# Patient Record
Sex: FEMALE
[De-identification: names, ages, dates, MRNs, and addresses within clinical notes are randomized per-mention and may not be internally consistent; named-entity substitution may affect disease eponyms.]

---

## 2018-02-12 ENCOUNTER — APPOINTMENT (OUTPATIENT)
Dept: OPHTHALMOLOGY | Facility: CLINIC | Age: 68
End: 2018-02-12
Payer: MEDICARE

## 2018-02-12 DIAGNOSIS — G35 MULTIPLE SCLEROSIS: ICD-10-CM

## 2018-02-12 PROCEDURE — 99203 OFFICE O/P NEW LOW 30 MIN: CPT

## 2018-02-12 RX ORDER — PROPANTHELINE BROMIDE 15 MG/1
15 TABLET, FILM COATED ORAL
Refills: 0 | Status: ACTIVE | COMMUNITY

## 2018-05-03 ENCOUNTER — APPOINTMENT (OUTPATIENT)
Dept: NEUROSURGERY | Facility: CLINIC | Age: 68
End: 2018-05-03
Payer: MEDICARE

## 2018-05-03 ENCOUNTER — APPOINTMENT (OUTPATIENT)
Dept: NEUROLOGY | Facility: CLINIC | Age: 68
End: 2018-05-03

## 2018-05-03 ENCOUNTER — APPOINTMENT (OUTPATIENT)
Dept: NEUROLOGY | Facility: CLINIC | Age: 68
End: 2018-05-03
Payer: MEDICARE

## 2018-05-03 DIAGNOSIS — G93.9 DISORDER OF BRAIN, UNSPECIFIED: ICD-10-CM

## 2018-05-03 DIAGNOSIS — R51 HEADACHE: ICD-10-CM

## 2018-05-03 PROCEDURE — 99203 OFFICE O/P NEW LOW 30 MIN: CPT

## 2018-05-03 PROCEDURE — 99205 OFFICE O/P NEW HI 60 MIN: CPT

## 2018-05-04 ENCOUNTER — TRANSCRIPTION ENCOUNTER (OUTPATIENT)
Age: 68
End: 2018-05-04

## 2018-05-07 ENCOUNTER — OUTPATIENT (OUTPATIENT)
Dept: OUTPATIENT SERVICES | Facility: HOSPITAL | Age: 68
LOS: 1 days | End: 2018-05-07

## 2018-05-07 VITALS
RESPIRATION RATE: 18 BRPM | DIASTOLIC BLOOD PRESSURE: 87 MMHG | HEIGHT: 64 IN | OXYGEN SATURATION: 98 % | HEART RATE: 63 BPM | SYSTOLIC BLOOD PRESSURE: 136 MMHG | WEIGHT: 115.96 LBS | TEMPERATURE: 98 F

## 2018-05-07 DIAGNOSIS — R56.9 UNSPECIFIED CONVULSIONS: ICD-10-CM

## 2018-05-07 DIAGNOSIS — Z01.818 ENCOUNTER FOR OTHER PREPROCEDURAL EXAMINATION: ICD-10-CM

## 2018-05-07 DIAGNOSIS — Z29.9 ENCOUNTER FOR PROPHYLACTIC MEASURES, UNSPECIFIED: ICD-10-CM

## 2018-05-07 DIAGNOSIS — D49.6 NEOPLASM OF UNSPECIFIED BEHAVIOR OF BRAIN: ICD-10-CM

## 2018-05-07 DIAGNOSIS — Z90.89 ACQUIRED ABSENCE OF OTHER ORGANS: Chronic | ICD-10-CM

## 2018-05-07 PROBLEM — R51 HEADACHE: Status: ACTIVE | Noted: 2018-05-07

## 2018-05-07 PROBLEM — G93.9 BRAIN LESION: Status: ACTIVE | Noted: 2018-05-07

## 2018-05-07 LAB
ANION GAP SERPL CALC-SCNC: 13 MMOL/L — SIGNIFICANT CHANGE UP (ref 5–17)
BLD GP AB SCN SERPL QL: NEGATIVE — SIGNIFICANT CHANGE UP
BUN SERPL-MCNC: 25 MG/DL — HIGH (ref 7–23)
CALCIUM SERPL-MCNC: 9.6 MG/DL — SIGNIFICANT CHANGE UP (ref 8.4–10.5)
CHLORIDE SERPL-SCNC: 101 MMOL/L — SIGNIFICANT CHANGE UP (ref 96–108)
CO2 SERPL-SCNC: 28 MMOL/L — SIGNIFICANT CHANGE UP (ref 22–31)
CREAT SERPL-MCNC: 0.61 MG/DL — SIGNIFICANT CHANGE UP (ref 0.5–1.3)
GLUCOSE SERPL-MCNC: 74 MG/DL — SIGNIFICANT CHANGE UP (ref 70–99)
HCT VFR BLD CALC: 39.7 % — SIGNIFICANT CHANGE UP (ref 34.5–45)
HGB BLD-MCNC: 12.7 G/DL — SIGNIFICANT CHANGE UP (ref 11.5–15.5)
MCHC RBC-ENTMCNC: 29.7 PG — SIGNIFICANT CHANGE UP (ref 27–34)
MCHC RBC-ENTMCNC: 32 GM/DL — SIGNIFICANT CHANGE UP (ref 32–36)
MCV RBC AUTO: 92.8 FL — SIGNIFICANT CHANGE UP (ref 80–100)
MRSA PCR RESULT.: SIGNIFICANT CHANGE UP
PLATELET # BLD AUTO: 279 K/UL — SIGNIFICANT CHANGE UP (ref 150–400)
POTASSIUM SERPL-MCNC: 4.2 MMOL/L — SIGNIFICANT CHANGE UP (ref 3.5–5.3)
POTASSIUM SERPL-SCNC: 4.2 MMOL/L — SIGNIFICANT CHANGE UP (ref 3.5–5.3)
RBC # BLD: 4.28 M/UL — SIGNIFICANT CHANGE UP (ref 3.8–5.2)
RBC # FLD: 13.5 % — SIGNIFICANT CHANGE UP (ref 10.3–14.5)
RH IG SCN BLD-IMP: POSITIVE — SIGNIFICANT CHANGE UP
S AUREUS DNA NOSE QL NAA+PROBE: DETECTED
SODIUM SERPL-SCNC: 142 MMOL/L — SIGNIFICANT CHANGE UP (ref 135–145)
WBC # BLD: 7 K/UL — SIGNIFICANT CHANGE UP (ref 3.8–10.5)
WBC # FLD AUTO: 7 K/UL — SIGNIFICANT CHANGE UP (ref 3.8–10.5)

## 2018-05-07 RX ORDER — SODIUM CHLORIDE 9 MG/ML
3 INJECTION INTRAMUSCULAR; INTRAVENOUS; SUBCUTANEOUS EVERY 8 HOURS
Qty: 0 | Refills: 0 | Status: DISCONTINUED | OUTPATIENT
Start: 2018-05-10 | End: 2018-05-10

## 2018-05-07 RX ORDER — LIDOCAINE HCL 20 MG/ML
0.2 VIAL (ML) INJECTION ONCE
Qty: 0 | Refills: 0 | Status: DISCONTINUED | OUTPATIENT
Start: 2018-05-10 | End: 2018-05-10

## 2018-05-07 RX ORDER — CEFAZOLIN SODIUM 1 G
2000 VIAL (EA) INJECTION ONCE
Qty: 0 | Refills: 0 | Status: DISCONTINUED | OUTPATIENT
Start: 2018-05-10 | End: 2018-05-10

## 2018-05-07 NOTE — H&P PST ADULT - ATTENDING COMMENTS
The patient has a new enhancing lesion affecting her right motor and premotor areas, causing left UE weakness in addition to prior deficits from MS. DDx includes various neoplasms and tumefactive MS. Plan: right craniotomy for biopsy. Risks, benefits, and alternatives have been discussed with the patient and her , and all questions were answered.

## 2018-05-07 NOTE — H&P PST ADULT - PRESSURE ULCER
Pt with left pressure sore on left heel and what appears to be fungus on he left big toe, consulted a podiatrist

## 2018-05-07 NOTE — H&P PST ADULT - HISTORY OF PRESENT ILLNESS
67yr old female presents to [facility] for a Stereotactic Biopsy for Tumor PC on 5/10/18. 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 Abnormality seen on recent MRI brain (large multinodular enhancing lesion) from 4/23/18. Pt wheelchair bound since 1987. Her baseline 6 months ago was paraplegic, incontinent, with little function of her right UE. Since her seizure in April, she has had reduced function of left hand significantly affecting any independence. Denies chest pain or SOB.

## 2018-05-07 NOTE — H&P PST ADULT - ASSESSMENT
CAPRINI SCORE [CLOT]    AGE RELATED RISK FACTORS                                                       MOBILITY RELATED FACTORS  [ ] Age 41-60 years                                            (1 Point)                  [ ] Bed rest                                                        (1 Point)  [ ] Age: 61-74 years                                           (2 Points)                 [ ] Plaster cast                                                   (2 Points)  [ ] Age= 75 years                                              (3 Points)                 [ ] Bed bound for more than 72 hours                 (2 Points)    DISEASE RELATED RISK FACTORS                                               GENDER SPECIFIC FACTORS  [ ] Edema in the lower extremities                       (1 Point)                  [ ] Pregnancy                                                     (1 Point)  [ ] Varicose veins                                               (1 Point)                  [ ] Post-partum < 6 weeks                                   (1 Point)             [ ] BMI > 25 Kg/m2                                            (1 Point)                  [ ] Hormonal therapy  or oral contraception          (1 Point)                 [ ] Sepsis (in the previous month)                        (1 Point)                  [ ] History of pregnancy complications                 (1 point)  [ ] Pneumonia or serious lung disease                                               [ ] Unexplained or recurrent                     (1 Point)           (in the previous month)                               (1 Point)  [ ] Abnormal pulmonary function test                     (1 Point)                 SURGERY RELATED RISK FACTORS  [ ] Acute myocardial infarction                              (1 Point)                 [ ]  Section                                             (1 Point)  [ ] Congestive heart failure (in the previous month)  (1 Point)               [ ] Minor surgery                                                  (1 Point)   [ ] Inflammatory bowel disease                             (1 Point)                 [ ] Arthroscopic surgery                                        (2 Points)  [ ] Central venous access                                      (2 Points)                [ ] General surgery lasting more than 45 minutes   (2 Points)       [ ] Stroke (in the previous month)                          (5 Points)               [ ] Elective arthroplasty                                         (5 Points)                                                                                                                                               HEMATOLOGY RELATED FACTORS                                                 TRAUMA RELATED RISK FACTORS  [ ] Prior episodes of VTE                                     (3 Points)                 [ ] Fracture of the hip, pelvis, or leg                       (5 Points)  [ ] Positive family history for VTE                         (3 Points)                 [ ] Acute spinal cord injury (in the previous month)  (5 Points)  [ ] Prothrombin 11259 A                                     (3 Points)                 [ ] Paralysis  (less than 1 month)                             (5 Points)  [ ] Factor V Leiden                                             (3 Points)                  [ ] Multiple Trauma within 1 month                        (5 Points)  [ ] Lupus anticoagulants                                     (3 Points)                                                           [ ] Anticardiolipin antibodies                               (3 Points)                                                       [ ] High homocysteine in the blood                      (3 Points)                                             [ ] Other congenital or acquired thrombophilia      (3 Points)                                                [ ] Heparin induced thrombocytopenia                  (3 Points)                                          Total Score [          ] CAPRINI SCORE [CLOT]    AGE RELATED RISK FACTORS                                                       MOBILITY RELATED FACTORS  [ ] Age 41-60 years                                            (1 Point)                  [x] Bed rest                                                        (1 Point)  [x ] Age: 61-74 years                                         (2 Points)            [ ] Plaster cast                                                   (2 Points)  [ ] Age= 75 years                                              (3 Points)                 [ ] Bed bound for more than 72 hours                 (2 Points)    DISEASE RELATED RISK FACTORS                                               GENDER SPECIFIC FACTORS  [ ] Edema in the lower extremities                       (1 Point)                  [ ] Pregnancy                                                     (1 Point)  [ ] Varicose veins                                               (1 Point)                  [ ] Post-partum < 6 weeks                                   (1 Point)             [ ] BMI > 25 Kg/m2                                            (1 Point)                  [ ] Hormonal therapy  or oral contraception          (1 Point)                 [ ] Sepsis (in the previous month)                        (1 Point)                  [ ] History of pregnancy complications                 (1 point)  [ ] Pneumonia or serious lung disease                                               [ ] Unexplained or recurrent                     (1 Point)           (in the previous month)                               (1 Point)  [ ] Abnormal pulmonary function test                     (1 Point)                 SURGERY RELATED RISK FACTORS  [ ] Acute myocardial infarction                              (1 Point)                 [ ]  Section                                             (1 Point)  [ ] Congestive heart failure (in the previous month)  (1 Point)               [ ] Minor surgery                                                  (1 Point)   [ ] Inflammatory bowel disease                             (1 Point)                 [ ] Arthroscopic surgery                                        (2 Points)  [ ] Central venous access                                      (2 Points)                [x ] General surgery lasting more than 45 minutes   (2 Points)       [ ] Stroke (in the previous month)                          (5 Points)               [ ] Elective arthroplasty                                         (5 Points)                                                                                                                                               HEMATOLOGY RELATED FACTORS                                                 TRAUMA RELATED RISK FACTORS  [ ] Prior episodes of VTE                                     (3 Points)                 [ ] Fracture of the hip, pelvis, or leg                       (5 Points)  [ ] Positive family history for VTE                         (3 Points)                 [ ] Acute spinal cord injury (in the previous month)  (5 Points)  [ ] Prothrombin 40010 A                                     (3 Points)                 [ ] Paralysis  (less than 1 month)                             (5 Points)  [ ] Factor V Leiden                                             (3 Points)                  [ ] Multiple Trauma within 1 month                        (5 Points)  [ ] Lupus anticoagulants                                     (3 Points)                                                           [ ] Anticardiolipin antibodies                               (3 Points)                                                       [ ] High homocysteine in the blood                      (3 Points)                                             [ ] Other congenital or acquired thrombophilia      (3 Points)                                                [ ] Heparin induced thrombocytopenia                  (3 Points)                                          Total Score [  5   ]

## 2018-05-07 NOTE — H&P PST ADULT - NSANTHOSAYNRD_GEN_A_CORE
No. ADRIANE screening performed.  STOP BANG Legend: 0-2 = LOW Risk; 3-4 = INTERMEDIATE Risk; 5-8 = HIGH Risk

## 2018-05-07 NOTE — H&P PST ADULT - PMH
Hypertension Hypertension    Multiple sclerosis    Neoplasm of unspecified behavior of brain    Seizures  focal left UE motor seizures  Wheelchair bound

## 2018-05-07 NOTE — H&P PST ADULT - FAMILY HISTORY
Mother  Still living? Yes, Estimated age: 84  Family history of hypertension, Age at diagnosis: Age Unknown     Father  Still living? No  Family history of rheumatic fever, Age at diagnosis: Age Unknown

## 2018-05-08 ENCOUNTER — FORM ENCOUNTER (OUTPATIENT)
Age: 68
End: 2018-05-08

## 2018-05-09 ENCOUNTER — TRANSCRIPTION ENCOUNTER (OUTPATIENT)
Age: 68
End: 2018-05-09

## 2018-05-09 ENCOUNTER — APPOINTMENT (OUTPATIENT)
Dept: MRI IMAGING | Facility: HOSPITAL | Age: 68
End: 2018-05-09

## 2018-05-09 ENCOUNTER — OUTPATIENT (OUTPATIENT)
Dept: OUTPATIENT SERVICES | Facility: HOSPITAL | Age: 68
LOS: 1 days | End: 2018-05-09
Payer: MEDICARE

## 2018-05-09 DIAGNOSIS — Z90.89 ACQUIRED ABSENCE OF OTHER ORGANS: Chronic | ICD-10-CM

## 2018-05-09 DIAGNOSIS — R51 HEADACHE: ICD-10-CM

## 2018-05-09 DIAGNOSIS — G93.9 DISORDER OF BRAIN, UNSPECIFIED: ICD-10-CM

## 2018-05-09 PROCEDURE — 70555 FMRI BRAIN BY PHYS/PSYCH: CPT | Mod: 26,59

## 2018-05-09 PROCEDURE — 70553 MRI BRAIN STEM W/O & W/DYE: CPT | Mod: 26

## 2018-05-10 ENCOUNTER — INPATIENT (INPATIENT)
Facility: HOSPITAL | Age: 68
LOS: 4 days | Discharge: NURSING, REHAB OR LONG-TERM CARE | DRG: 25 | End: 2018-05-15
Attending: NEUROLOGICAL SURGERY | Admitting: NEUROLOGICAL SURGERY
Payer: MEDICARE

## 2018-05-10 ENCOUNTER — APPOINTMENT (OUTPATIENT)
Dept: NEUROSURGERY | Facility: HOSPITAL | Age: 68
End: 2018-05-10

## 2018-05-10 ENCOUNTER — RESULT REVIEW (OUTPATIENT)
Age: 68
End: 2018-05-10

## 2018-05-10 VITALS
TEMPERATURE: 98 F | OXYGEN SATURATION: 100 % | HEART RATE: 62 BPM | RESPIRATION RATE: 16 BRPM | DIASTOLIC BLOOD PRESSURE: 65 MMHG | SYSTOLIC BLOOD PRESSURE: 143 MMHG | WEIGHT: 115.96 LBS | HEIGHT: 64 IN

## 2018-05-10 DIAGNOSIS — D49.6 NEOPLASM OF UNSPECIFIED BEHAVIOR OF BRAIN: ICD-10-CM

## 2018-05-10 DIAGNOSIS — Z90.89 ACQUIRED ABSENCE OF OTHER ORGANS: Chronic | ICD-10-CM

## 2018-05-10 LAB
ANION GAP SERPL CALC-SCNC: 11 MMOL/L — SIGNIFICANT CHANGE UP (ref 5–17)
ANION GAP SERPL CALC-SCNC: 14 MMOL/L — SIGNIFICANT CHANGE UP (ref 5–17)
BUN SERPL-MCNC: 17 MG/DL — SIGNIFICANT CHANGE UP (ref 7–23)
BUN SERPL-MCNC: 21 MG/DL — SIGNIFICANT CHANGE UP (ref 7–23)
CALCIUM SERPL-MCNC: 8.9 MG/DL — SIGNIFICANT CHANGE UP (ref 8.4–10.5)
CALCIUM SERPL-MCNC: 9.4 MG/DL — SIGNIFICANT CHANGE UP (ref 8.4–10.5)
CHLORIDE SERPL-SCNC: 101 MMOL/L — SIGNIFICANT CHANGE UP (ref 96–108)
CHLORIDE SERPL-SCNC: 101 MMOL/L — SIGNIFICANT CHANGE UP (ref 96–108)
CO2 SERPL-SCNC: 24 MMOL/L — SIGNIFICANT CHANGE UP (ref 22–31)
CO2 SERPL-SCNC: 25 MMOL/L — SIGNIFICANT CHANGE UP (ref 22–31)
CREAT SERPL-MCNC: 0.58 MG/DL — SIGNIFICANT CHANGE UP (ref 0.5–1.3)
CREAT SERPL-MCNC: 0.61 MG/DL — SIGNIFICANT CHANGE UP (ref 0.5–1.3)
GLUCOSE SERPL-MCNC: 136 MG/DL — HIGH (ref 70–99)
GLUCOSE SERPL-MCNC: 160 MG/DL — HIGH (ref 70–99)
HCT VFR BLD CALC: 39 % — SIGNIFICANT CHANGE UP (ref 34.5–45)
HCT VFR BLD CALC: 40.8 % — SIGNIFICANT CHANGE UP (ref 34.5–45)
HGB BLD-MCNC: 12.5 G/DL — SIGNIFICANT CHANGE UP (ref 11.5–15.5)
HGB BLD-MCNC: 13.6 G/DL — SIGNIFICANT CHANGE UP (ref 11.5–15.5)
MAGNESIUM SERPL-MCNC: 1.7 MG/DL — SIGNIFICANT CHANGE UP (ref 1.6–2.6)
MCHC RBC-ENTMCNC: 30.1 PG — SIGNIFICANT CHANGE UP (ref 27–34)
MCHC RBC-ENTMCNC: 31.2 PG — SIGNIFICANT CHANGE UP (ref 27–34)
MCHC RBC-ENTMCNC: 32.2 GM/DL — SIGNIFICANT CHANGE UP (ref 32–36)
MCHC RBC-ENTMCNC: 33.3 GM/DL — SIGNIFICANT CHANGE UP (ref 32–36)
MCV RBC AUTO: 93.4 FL — SIGNIFICANT CHANGE UP (ref 80–100)
MCV RBC AUTO: 93.9 FL — SIGNIFICANT CHANGE UP (ref 80–100)
PHOSPHATE SERPL-MCNC: 3.5 MG/DL — SIGNIFICANT CHANGE UP (ref 2.5–4.5)
PLATELET # BLD AUTO: 245 K/UL — SIGNIFICANT CHANGE UP (ref 150–400)
PLATELET # BLD AUTO: 273 K/UL — SIGNIFICANT CHANGE UP (ref 150–400)
POTASSIUM SERPL-MCNC: 4.2 MMOL/L — SIGNIFICANT CHANGE UP (ref 3.5–5.3)
POTASSIUM SERPL-MCNC: 4.3 MMOL/L — SIGNIFICANT CHANGE UP (ref 3.5–5.3)
POTASSIUM SERPL-SCNC: 4.2 MMOL/L — SIGNIFICANT CHANGE UP (ref 3.5–5.3)
POTASSIUM SERPL-SCNC: 4.3 MMOL/L — SIGNIFICANT CHANGE UP (ref 3.5–5.3)
RBC # BLD: 4.17 M/UL — SIGNIFICANT CHANGE UP (ref 3.8–5.2)
RBC # BLD: 4.35 M/UL — SIGNIFICANT CHANGE UP (ref 3.8–5.2)
RBC # FLD: 11.3 % — SIGNIFICANT CHANGE UP (ref 10.3–14.5)
RBC # FLD: 11.4 % — SIGNIFICANT CHANGE UP (ref 10.3–14.5)
SODIUM SERPL-SCNC: 137 MMOL/L — SIGNIFICANT CHANGE UP (ref 135–145)
SODIUM SERPL-SCNC: 139 MMOL/L — SIGNIFICANT CHANGE UP (ref 135–145)
WBC # BLD: 10.4 K/UL — SIGNIFICANT CHANGE UP (ref 3.8–10.5)
WBC # BLD: 7.6 K/UL — SIGNIFICANT CHANGE UP (ref 3.8–10.5)
WBC # FLD AUTO: 10.4 K/UL — SIGNIFICANT CHANGE UP (ref 3.8–10.5)
WBC # FLD AUTO: 7.6 K/UL — SIGNIFICANT CHANGE UP (ref 3.8–10.5)

## 2018-05-10 PROCEDURE — 88341 IMHCHEM/IMCYTCHM EA ADD ANTB: CPT | Mod: 26,59

## 2018-05-10 PROCEDURE — 61510 CRNEC TREPH EXC BRN TUM STTL: CPT

## 2018-05-10 PROCEDURE — 88360 TUMOR IMMUNOHISTOCHEM/MANUAL: CPT | Mod: 26

## 2018-05-10 PROCEDURE — 88307 TISSUE EXAM BY PATHOLOGIST: CPT | Mod: 26

## 2018-05-10 PROCEDURE — 99292 CRITICAL CARE ADDL 30 MIN: CPT

## 2018-05-10 PROCEDURE — 71045 X-RAY EXAM CHEST 1 VIEW: CPT | Mod: 26

## 2018-05-10 PROCEDURE — 70450 CT HEAD/BRAIN W/O DYE: CPT | Mod: 26

## 2018-05-10 PROCEDURE — 95961 ELECTRODE STIMULATION BRAIN: CPT | Mod: 26,52

## 2018-05-10 PROCEDURE — 99291 CRITICAL CARE FIRST HOUR: CPT

## 2018-05-10 PROCEDURE — 88342 IMHCHEM/IMCYTCHM 1ST ANTB: CPT | Mod: 26,59

## 2018-05-10 PROCEDURE — 88331 PATH CONSLTJ SURG 1 BLK 1SPC: CPT | Mod: 26

## 2018-05-10 PROCEDURE — 61781 SCAN PROC CRANIAL INTRA: CPT

## 2018-05-10 RX ORDER — HYDROMORPHONE HYDROCHLORIDE 2 MG/ML
0.25 INJECTION INTRAMUSCULAR; INTRAVENOUS; SUBCUTANEOUS
Qty: 0 | Refills: 0 | Status: DISCONTINUED | OUTPATIENT
Start: 2018-05-10 | End: 2018-05-11

## 2018-05-10 RX ORDER — LEVETIRACETAM 250 MG/1
750 TABLET, FILM COATED ORAL
Qty: 0 | Refills: 0 | Status: DISCONTINUED | OUTPATIENT
Start: 2018-05-10 | End: 2018-05-11

## 2018-05-10 RX ORDER — METOPROLOL TARTRATE 50 MG
25 TABLET ORAL
Qty: 0 | Refills: 0 | Status: DISCONTINUED | OUTPATIENT
Start: 2018-05-10 | End: 2018-05-15

## 2018-05-10 RX ORDER — OXYCODONE HYDROCHLORIDE 5 MG/1
5 TABLET ORAL EVERY 4 HOURS
Qty: 0 | Refills: 0 | Status: DISCONTINUED | OUTPATIENT
Start: 2018-05-10 | End: 2018-05-15

## 2018-05-10 RX ORDER — DEXTROSE MONOHYDRATE, SODIUM CHLORIDE, AND POTASSIUM CHLORIDE 50; .745; 4.5 G/1000ML; G/1000ML; G/1000ML
1000 INJECTION, SOLUTION INTRAVENOUS
Qty: 0 | Refills: 0 | Status: DISCONTINUED | OUTPATIENT
Start: 2018-05-10 | End: 2018-05-11

## 2018-05-10 RX ORDER — HYDROMORPHONE HYDROCHLORIDE 2 MG/ML
0.5 INJECTION INTRAMUSCULAR; INTRAVENOUS; SUBCUTANEOUS
Qty: 0 | Refills: 0 | Status: DISCONTINUED | OUTPATIENT
Start: 2018-05-10 | End: 2018-05-11

## 2018-05-10 RX ORDER — ONDANSETRON 8 MG/1
4 TABLET, FILM COATED ORAL ONCE
Qty: 0 | Refills: 0 | Status: DISCONTINUED | OUTPATIENT
Start: 2018-05-10 | End: 2018-05-11

## 2018-05-10 RX ORDER — DOCUSATE SODIUM 100 MG
100 CAPSULE ORAL THREE TIMES A DAY
Qty: 0 | Refills: 0 | Status: DISCONTINUED | OUTPATIENT
Start: 2018-05-10 | End: 2018-05-15

## 2018-05-10 RX ORDER — ACETAMINOPHEN 500 MG
1000 TABLET ORAL ONCE
Qty: 0 | Refills: 0 | Status: DISCONTINUED | OUTPATIENT
Start: 2018-05-10 | End: 2018-05-11

## 2018-05-10 RX ORDER — NAFCILLIN 10 G/100ML
2 INJECTION, POWDER, FOR SOLUTION INTRAVENOUS EVERY 6 HOURS
Qty: 0 | Refills: 0 | Status: DISCONTINUED | OUTPATIENT
Start: 2018-05-10 | End: 2018-05-11

## 2018-05-10 RX ORDER — OXYCODONE HYDROCHLORIDE 5 MG/1
10 TABLET ORAL EVERY 4 HOURS
Qty: 0 | Refills: 0 | Status: DISCONTINUED | OUTPATIENT
Start: 2018-05-10 | End: 2018-05-15

## 2018-05-10 RX ORDER — DEXAMETHASONE 0.5 MG/5ML
4 ELIXIR ORAL EVERY 6 HOURS
Qty: 0 | Refills: 0 | Status: DISCONTINUED | OUTPATIENT
Start: 2018-05-10 | End: 2018-05-12

## 2018-05-10 RX ORDER — ACETAMINOPHEN 500 MG
650 TABLET ORAL EVERY 6 HOURS
Qty: 0 | Refills: 0 | Status: DISCONTINUED | OUTPATIENT
Start: 2018-05-10 | End: 2018-05-15

## 2018-05-10 RX ORDER — ONDANSETRON 8 MG/1
4 TABLET, FILM COATED ORAL EVERY 6 HOURS
Qty: 0 | Refills: 0 | Status: DISCONTINUED | OUTPATIENT
Start: 2018-05-10 | End: 2018-05-15

## 2018-05-10 RX ORDER — CHOLECALCIFEROL (VITAMIN D3) 125 MCG
1000 CAPSULE ORAL DAILY
Qty: 0 | Refills: 0 | Status: DISCONTINUED | OUTPATIENT
Start: 2018-05-10 | End: 2018-05-15

## 2018-05-10 RX ORDER — SERTRALINE 25 MG/1
100 TABLET, FILM COATED ORAL DAILY
Qty: 0 | Refills: 0 | Status: DISCONTINUED | OUTPATIENT
Start: 2018-05-10 | End: 2018-05-15

## 2018-05-10 RX ADMIN — Medication 25 MILLIGRAM(S): at 17:43

## 2018-05-10 RX ADMIN — LEVETIRACETAM 750 MILLIGRAM(S): 250 TABLET, FILM COATED ORAL at 17:43

## 2018-05-10 RX ADMIN — DEXTROSE MONOHYDRATE, SODIUM CHLORIDE, AND POTASSIUM CHLORIDE 75 MILLILITER(S): 50; .745; 4.5 INJECTION, SOLUTION INTRAVENOUS at 16:00

## 2018-05-10 RX ADMIN — NAFCILLIN 200 GRAM(S): 10 INJECTION, POWDER, FOR SOLUTION INTRAVENOUS at 17:36

## 2018-05-10 RX ADMIN — Medication 100 MILLIGRAM(S): at 22:46

## 2018-05-10 RX ADMIN — Medication 4 MILLIGRAM(S): at 17:43

## 2018-05-10 NOTE — PROGRESS NOTE ADULT - SUBJECTIVE AND OBJECTIVE BOX
HPI:  67yr old female presents to [facility] for a Stereotactic Biopsy for Tumor PC on 5/10/18. 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 Abnormality seen on recent MRI brain (large multinodular enhancing lesion) from 4/23/18. Pt wheelchair bound since 1987. Her baseline 6 months ago was paraplegic, incontinent, with little function of her right UE. Since her seizure in April, she has had reduced function of left hand significantly affecting any independence.  Frozen section suggestive of glioma.    OVERNIGHT EVENTS:   No acute events overnight.    VITALS:  T(C): , Max: 36.7 (05-10-18 @ 06:28)  HR:  (62 - 74)  BP:  (123/66 - 158/80)  ABP:  (150/73 - 167/78)  RR:  (14 - 16)  SpO2:  (99% - 100%)  Wt(kg): --      LABS:  Na: 137 (05-10 @ 12:53)  K: 4.2 (05-10 @ 12:53)  Cl: 101 (05-10 @ 12:53)  CO2: 25 (05-10 @ 12:53)  BUN: 21 (05-10 @ 12:53)  Cr: 0.58 (05-10 @ 12:53)  Glu:     Hgb: 12.5 (05-10 @ 12:53), 12.7 (05-07 @ 14:41)  Hct: 39.0 (05-10 @ 12:53), 39.7 (05-07 @ 14:41)  WBC: 7.6 (05-10 @ 12:53), 7.00 (05-07 @ 14:41)  Plt: 245 (05-10 @ 12:53), 279 (05-07 @ 14:41)      IMAGING:   Recent imaging studies were reviewed.    MEDICATIONS:  acetaminophen   Tablet 650 milliGRAM(s) Oral every 6 hours PRN  acetaminophen   Tablet. 650 milliGRAM(s) Oral every 6 hours PRN  acetaminophen  IVPB. 1000 milliGRAM(s) IV Intermittent once  cholecalciferol 1000 Unit(s) Oral daily  dexamethasone     Tablet 4 milliGRAM(s) Oral every 6 hours  docusate sodium 100 milliGRAM(s) Oral three times a day  HYDROmorphone  Injectable 0.25 milliGRAM(s) IV Push every 10 minutes PRN  HYDROmorphone  Injectable 0.5 milliGRAM(s) IV Push every 10 minutes PRN  levETIRAcetam 750 milliGRAM(s) Oral two times a day  metoprolol tartrate 25 milliGRAM(s) Oral two times a day  nafcillin  IVPB 2 Gram(s) IV Intermittent every 6 hours  ondansetron Injectable 4 milliGRAM(s) IV Push every 6 hours PRN  ondansetron Injectable 4 milliGRAM(s) IV Push once PRN  oxyCODONE    IR 5 milliGRAM(s) Oral every 4 hours PRN  oxyCODONE    IR 10 milliGRAM(s) Oral every 4 hours PRN  sertraline 100 milliGRAM(s) Oral daily  sodium chloride 0.9% with potassium chloride 20 mEq/L 1000 milliLiter(s) IV Continuous <Continuous>    EXAMINATION:  General:  calm  HEENT:  MMM  Neuro:  awake, alert, oriented, follows commands, 2/5 proximal movement of LUE, full strength in RUE, BLE minimal movement -- clonus, hyperreflexia  Cards:  RRR  Respiratory:  no respiratory distress  Adomen:  soft  Extremities:  Swelling in LUE, significantly improved  Skin:  warm/dry HPI:  67yr old female presents to [facility] for a Stereotactic Biopsy for Tumor PC on 5/10/18. 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 Abnormality seen on recent MRI brain (large multinodular enhancing lesion) from 4/23/18. Pt wheelchair bound since 1987. Her baseline 6 months ago was paraplegic, incontinent, with little function of her right UE. Since her seizure in April, she has had reduced function of left hand significantly affecting any independence.  Frozen section suggestive of glioma.     EVENTS:    Frozen section suggestive of glioma.    VITALS:  T(C): , Max: 36.7 (05-10-18 @ 06:28)  HR:  (62 - 74)  BP:  (123/66 - 158/80)  ABP:  (150/73 - 167/78)  RR:  (14 - 16)  SpO2:  (99% - 100%)  Wt(kg): --      LABS:  Na: 137 (05-10 @ 12:53)  K: 4.2 (05-10 @ 12:53)  Cl: 101 (05-10 @ 12:53)  CO2: 25 (05-10 @ 12:53)  BUN: 21 (05-10 @ 12:53)  Cr: 0.58 (05-10 @ 12:53)  Glu:     Hgb: 12.5 (05-10 @ 12:53), 12.7 (05-07 @ 14:41)  Hct: 39.0 (05-10 @ 12:53), 39.7 (05-07 @ 14:41)  WBC: 7.6 (05-10 @ 12:53), 7.00 (05-07 @ 14:41)  Plt: 245 (05-10 @ 12:53), 279 (05-07 @ 14:41)      IMAGING:   Recent imaging studies were reviewed.    MEDICATIONS:  acetaminophen   Tablet 650 milliGRAM(s) Oral every 6 hours PRN  acetaminophen   Tablet. 650 milliGRAM(s) Oral every 6 hours PRN  acetaminophen  IVPB. 1000 milliGRAM(s) IV Intermittent once  cholecalciferol 1000 Unit(s) Oral daily  dexamethasone     Tablet 4 milliGRAM(s) Oral every 6 hours  docusate sodium 100 milliGRAM(s) Oral three times a day  HYDROmorphone  Injectable 0.25 milliGRAM(s) IV Push every 10 minutes PRN  HYDROmorphone  Injectable 0.5 milliGRAM(s) IV Push every 10 minutes PRN  levETIRAcetam 750 milliGRAM(s) Oral two times a day  metoprolol tartrate 25 milliGRAM(s) Oral two times a day  nafcillin  IVPB 2 Gram(s) IV Intermittent every 6 hours  ondansetron Injectable 4 milliGRAM(s) IV Push every 6 hours PRN  ondansetron Injectable 4 milliGRAM(s) IV Push once PRN  oxyCODONE    IR 5 milliGRAM(s) Oral every 4 hours PRN  oxyCODONE    IR 10 milliGRAM(s) Oral every 4 hours PRN  sertraline 100 milliGRAM(s) Oral daily  sodium chloride 0.9% with potassium chloride 20 mEq/L 1000 milliLiter(s) IV Continuous <Continuous>    EXAMINATION:  General:  calm  HEENT:  MMM  Neuro:  awake, alert, oriented, follows commands, 1/5 proximal movement of LUE, full strength in RUE, BLE 0/5 -- clonus, hyperreflexia  Cards:  RRR  Respiratory:  no respiratory distress  Adomen:  soft  Extremities:  Swelling in LUE, significantly improved, has good radial pulses   Skin:  warm/dry HPI:  67yr old female presents to [facility] for a Stereotactic Biopsy for Tumor PC on 5/10/18. 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 Abnormality seen on recent MRI brain (large multinodular enhancing lesion) from 4/23/18. Pt wheelchair bound since 1987. Her baseline 6 months ago was paraplegic, incontinent, with little function of her right UE. Since her seizure in April, she has had reduced function of left hand significantly affecting any independence.  Frozen section suggestive of glioma.     EVENTS:    Right frontal craniotomy for open biopsy of Right tumor  Frozen gliomaFrozen section suggestive of glioma.    VITALS:  T(C): , Max: 36.7 (05-10-18 @ 06:28)  HR:  (62 - 74)  BP:  (123/66 - 158/80)  ABP:  (150/73 - 167/78)  RR:  (14 - 16)  SpO2:  (99% - 100%)  Wt(kg): --      LABS:  Na: 137 (05-10 @ 12:53)  K: 4.2 (05-10 @ 12:53)  Cl: 101 (05-10 @ 12:53)  CO2: 25 (05-10 @ 12:53)  BUN: 21 (05-10 @ 12:53)  Cr: 0.58 (05-10 @ 12:53)  Glu:     Hgb: 12.5 (05-10 @ 12:53), 12.7 (05-07 @ 14:41)  Hct: 39.0 (05-10 @ 12:53), 39.7 (05-07 @ 14:41)  WBC: 7.6 (05-10 @ 12:53), 7.00 (05-07 @ 14:41)  Plt: 245 (05-10 @ 12:53), 279 (05-07 @ 14:41)      IMAGING:   Recent imaging studies were reviewed.    MEDICATIONS:  acetaminophen   Tablet 650 milliGRAM(s) Oral every 6 hours PRN  acetaminophen   Tablet. 650 milliGRAM(s) Oral every 6 hours PRN  acetaminophen  IVPB. 1000 milliGRAM(s) IV Intermittent once  cholecalciferol 1000 Unit(s) Oral daily  dexamethasone     Tablet 4 milliGRAM(s) Oral every 6 hours  docusate sodium 100 milliGRAM(s) Oral three times a day  HYDROmorphone  Injectable 0.25 milliGRAM(s) IV Push every 10 minutes PRN  HYDROmorphone  Injectable 0.5 milliGRAM(s) IV Push every 10 minutes PRN  levETIRAcetam 750 milliGRAM(s) Oral two times a day  metoprolol tartrate 25 milliGRAM(s) Oral two times a day  nafcillin  IVPB 2 Gram(s) IV Intermittent every 6 hours  ondansetron Injectable 4 milliGRAM(s) IV Push every 6 hours PRN  ondansetron Injectable 4 milliGRAM(s) IV Push once PRN  oxyCODONE    IR 5 milliGRAM(s) Oral every 4 hours PRN  oxyCODONE    IR 10 milliGRAM(s) Oral every 4 hours PRN  sertraline 100 milliGRAM(s) Oral daily  sodium chloride 0.9% with potassium chloride 20 mEq/L 1000 milliLiter(s) IV Continuous <Continuous>    EXAMINATION:  General:  calm  HEENT:  MMM  Neuro:  awake, alert, oriented, follows commands, 1/5 proximal movement of LUE, full strength in RUE, BLE 0/5 -- clonus, hyperreflexia  Cards:  RRR  Respiratory:  no respiratory distress  Adomen:  soft  Extremities:  Swelling in LUE, significantly improved, has good radial pulses   Skin:  warm/dry

## 2018-05-10 NOTE — CHART NOTE - NSCHARTNOTEFT_GEN_A_CORE
Pt seen and examined at 20:00. On exam, Pt is alert and fully oriented. L facial droop present--new per family. RUE del 3/5, bi/tri 4-/5, HG 3/5 baseline per patient. LUE +edema, del 1/5, bi/tri 2/5, HG 0/5. BLE 0/5 and has clonus. D/W neurosurgery, [facility] now.

## 2018-05-10 NOTE — PATIENT PROFILE ADULT. - PMH
Hypertension    Multiple sclerosis    Neoplasm of unspecified behavior of brain    Seizures  focal left UE motor seizures  Wheelchair bound

## 2018-05-10 NOTE — PROGRESS NOTE ADULT - SUBJECTIVE AND OBJECTIVE BOX
Mrs. Ordonez is a 67 year old right handed female who presents for biopsy of right frontal lesion. She has a history of chronic progressive multiple sclerosis, diagnosed in 1975. Since diagnosed she has experienced slowly worsening paresis, now to the point of paraplegia and inability to use right upper extremity. In April on this year she began have simple partial seizures of left upper extremity and recent MRI showed right frontal heterogenously enhancing mass. She is awake, alert, following commands, pupils reactive. The left upper extremity is paretic with 2/5 strength and the other extremities are plegic.     She presents for open biopsy of right frontal lesion via right craniotomy.

## 2018-05-11 PROCEDURE — 99233 SBSQ HOSP IP/OBS HIGH 50: CPT

## 2018-05-11 PROCEDURE — 95819 EEG AWAKE AND ASLEEP: CPT | Mod: 26

## 2018-05-11 RX ORDER — SENNA PLUS 8.6 MG/1
2 TABLET ORAL AT BEDTIME
Qty: 0 | Refills: 0 | Status: DISCONTINUED | OUTPATIENT
Start: 2018-05-11 | End: 2018-05-15

## 2018-05-11 RX ORDER — LEVETIRACETAM 250 MG/1
1000 TABLET, FILM COATED ORAL
Qty: 0 | Refills: 0 | Status: DISCONTINUED | OUTPATIENT
Start: 2018-05-11 | End: 2018-05-13

## 2018-05-11 RX ORDER — ENOXAPARIN SODIUM 100 MG/ML
40 INJECTION SUBCUTANEOUS AT BEDTIME
Qty: 0 | Refills: 0 | Status: DISCONTINUED | OUTPATIENT
Start: 2018-05-12 | End: 2018-05-15

## 2018-05-11 RX ORDER — MAGNESIUM SULFATE 500 MG/ML
2 VIAL (ML) INJECTION ONCE
Qty: 0 | Refills: 0 | Status: COMPLETED | OUTPATIENT
Start: 2018-05-11 | End: 2018-05-11

## 2018-05-11 RX ADMIN — Medication 100 MILLIGRAM(S): at 06:24

## 2018-05-11 RX ADMIN — NAFCILLIN 200 GRAM(S): 10 INJECTION, POWDER, FOR SOLUTION INTRAVENOUS at 06:00

## 2018-05-11 RX ADMIN — NAFCILLIN 200 GRAM(S): 10 INJECTION, POWDER, FOR SOLUTION INTRAVENOUS at 00:04

## 2018-05-11 RX ADMIN — Medication 100 MILLIGRAM(S): at 22:07

## 2018-05-11 RX ADMIN — LEVETIRACETAM 1000 MILLIGRAM(S): 250 TABLET, FILM COATED ORAL at 06:24

## 2018-05-11 RX ADMIN — SENNA PLUS 2 TABLET(S): 8.6 TABLET ORAL at 22:07

## 2018-05-11 RX ADMIN — Medication 4 MILLIGRAM(S): at 12:15

## 2018-05-11 RX ADMIN — LEVETIRACETAM 1000 MILLIGRAM(S): 250 TABLET, FILM COATED ORAL at 18:11

## 2018-05-11 RX ADMIN — Medication 1000 UNIT(S): at 13:18

## 2018-05-11 RX ADMIN — Medication 25 MILLIGRAM(S): at 06:24

## 2018-05-11 RX ADMIN — Medication 4 MILLIGRAM(S): at 18:11

## 2018-05-11 RX ADMIN — Medication 50 GRAM(S): at 01:15

## 2018-05-11 RX ADMIN — SERTRALINE 100 MILLIGRAM(S): 25 TABLET, FILM COATED ORAL at 13:18

## 2018-05-11 RX ADMIN — Medication 100 MILLIGRAM(S): at 13:17

## 2018-05-11 RX ADMIN — DEXTROSE MONOHYDRATE, SODIUM CHLORIDE, AND POTASSIUM CHLORIDE 75 MILLILITER(S): 50; .745; 4.5 INJECTION, SOLUTION INTRAVENOUS at 06:52

## 2018-05-11 RX ADMIN — Medication 4 MILLIGRAM(S): at 06:24

## 2018-05-11 RX ADMIN — Medication 4 MILLIGRAM(S): at 00:01

## 2018-05-11 NOTE — PROGRESS NOTE ADULT - SUBJECTIVE AND OBJECTIVE BOX
Patient Seen and Examined.     Overnight Events:   Had a subtle L facial which family states was new. Patient taken for CTH which showed some edema, no hemorrhage.     T(C): 36.8 (05-10-18 @ 22:00), Max: 36.9 (05-10-18 @ 19:00)  HR: 79 (05-11-18 @ 03:00) (62 - 81)  BP: 127/58 (05-11-18 @ 03:00) (120/63 - 168/77)  RR: 16 (05-11-18 @ 03:00) (14 - 20)  SpO2: 100% (05-11-18 @ 03:00) (99% - 100%)    Exam:   AOx3, FC, Lt facial, PERRL,   RUE delt 3/5, bi/tri 4-/5, HG 3/5 (finger contraction),   LUE delt 1/5, b/t 2/5, HG 0/5,   BLE 0/5 + clonus    acetaminophen   Tablet 650 milliGRAM(s) Oral every 6 hours PRN  acetaminophen   Tablet. 650 milliGRAM(s) Oral every 6 hours PRN  acetaminophen  IVPB. 1000 milliGRAM(s) IV Intermittent once  cholecalciferol 1000 Unit(s) Oral daily  dexamethasone     Tablet 4 milliGRAM(s) Oral every 6 hours  docusate sodium 100 milliGRAM(s) Oral three times a day  HYDROmorphone  Injectable 0.25 milliGRAM(s) IV Push every 10 minutes PRN  HYDROmorphone  Injectable 0.5 milliGRAM(s) IV Push every 10 minutes PRN  levETIRAcetam 1000 milliGRAM(s) Oral two times a day  metoprolol tartrate 25 milliGRAM(s) Oral two times a day  nafcillin  IVPB 2 Gram(s) IV Intermittent every 6 hours  ondansetron Injectable 4 milliGRAM(s) IV Push every 6 hours PRN  ondansetron Injectable 4 milliGRAM(s) IV Push once PRN  oxyCODONE    IR 5 milliGRAM(s) Oral every 4 hours PRN  oxyCODONE    IR 10 milliGRAM(s) Oral every 4 hours PRN  sertraline 100 milliGRAM(s) Oral daily  sodium chloride 0.9% with potassium chloride 20 mEq/L 1000 milliLiter(s) IV Continuous <Continuous>                            13.6   10.4  )-----------( 273      ( 10 May 2018 21:24 )             40.8     05-10    139  |  101  |  17  ----------------------------<  160<H>  4.3   |  24  |  0.61    Ca    9.4      10 May 2018 21:24  Phos  3.5     05-10  Mg     1.7     05-10          Imaging:   CTH: post op changes, no hemorrhage, some associated edema

## 2018-05-11 NOTE — EEG REPORT - NS EEG TEXT BOX
[facility]  Report of Routine EEG with Video    [facility]: [address], [address], [place] [address], Phone: [phone]  [facility]: [address] [place] [address], [place] [address], Phone: [phone]  Office: [address] [facility], [facility] [address], [place] [address], Phone: [phone]    Patient Name: Mala Ordonez    Age: 67 y  : 1950  Patient ID: -, MRN #: MR# [id number], Location: [address] [place] [address]  Referring Physician: -    EEG #: [id number]  Study Date: 2018		    Technical Information:					  On Instrument: [id number]  Placement and Labeling of Electrodes:  The EEG was performed utilizing 20 channels referential EEG connections (coronal over temporal over parasagittal montage) using all standard 10-20 electrode placements with EKG.  Recording was at a sampling rate of 256 samples per second per channel.  Time synchronized digital video recording was done simultaneously with EEG recording.  A low light infrared camera was used for low light recording.  Brenden and seizure detection algorithms were utilized.    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 Abnormality seen on MRI brain (large multinodular enhancing lesion) from 18, wheelchair bound    -    Medication	  No Data.	    Study Interpretation:    FINDINGS:  The background was continuous, spontaneously variable and reactive. During wakefulness, the posteriorly dominant rhythm consisted of symmetric, well-modulated 10 Hz activity, with amplitude to 30 uV, that attenuated to eye opening. Low amplitude frontal beta was noted in wakefulness.    Generalized Slowing:  No generalized background slowing was present.    Focal Slowing:   -Continuous Slowing, Regional, Right Central (delta and theta)  -Intermittent Slowing, Lateralized, Right hemisphere (delta)    Sleep Background:  Drowsiness was characterized by fragmentation, attenuation, and slowing of the background activity.    Stage II sleep transients were not recorded.    Other Non-Epileptiform Findings:  None were present.    Interictal Epileptiform Activity:   None were present.    Events:  Clinical events: None recorded.  Seizures: None recorded.    Activation Procedures:   Hyperventilation was not performed.    Photic stimulation was not performed.    Artifacts:  Intermittent myogenic and movement artifacts were noted.    ECG:  The heart rate on single channel ECG was predominantly between 60-80 BPM.    EEG Summary / Classification:  Abnormal EEG in the awake, drowsy states.  -Continuous Slowing, Regional, Right Central (delta and theta)  -Intermittent Slowing, Lateralized, Right hemisphere (delta)    EEG Impression / Clinical Correlate:  This EEG provides evidence of a structural lesion in the right central region.  There were no epileptiform abnormalities recorded.        Can Good MD  Attending Physician, [facility]

## 2018-05-11 NOTE — PROGRESS NOTE ADULT - SUBJECTIVE AND OBJECTIVE BOX
O: had twitching of the left arm            REVIEW OF SYSTEMS: [ ] Unable to Assess due to neurologic exam   [x ] All ROS addressed below are non-contributory, except:  Neuro: [ ] Headache [ ] Back pain [ ] Numbness [ ] Weakness [ ] Ataxia [ ] Dizziness [ ] Aphasia [ ] Dysarthria [ ] Visual disturbance  Resp: [ ] Shortness of breath/dyspnea, [ ] Orthopnea [ ] Cough  CV: [ ] Chest pain [ ] Palpitation [ ] Lightheadedness [ ] Syncope  Renal: [ ] Thirst [ ] Edema  GI: [ ] Nausea [ ] Emesis [ ] Abdominal pain [ ] Constipation [ ] Diarrhea  Hem: [ ] Hematemesis [ ] bright red blood per rectum  ID: [ ] Fever [ ] Chills [ ] Dysuria  ENT: [ ] Rhinorrhea          T(C): 36.6 (05-11-18 @ 08:00), Max: 36.9 (05-10-18 @ 19:00)  HR: 83 (05-11-18 @ 07:00) (62 - 83)  BP: 130/58 (05-11-18 @ 07:00) (120/63 - 168/77)  RR: 16 (05-11-18 @ 08:00) (14 - 20)  SpO2: 100% (05-11-18 @ 08:00) (99% - 100%)  05-10-18 @ 07:01  -  05-11-18 @ 07:00  --------------------------------------------------------  IN: 2725 mL / OUT: 1930 mL / NET: 795 mL    05-11-18 @ 07:01  -  05-11-18 @ 08:15  --------------------------------------------------------  IN: 75 mL / OUT: 40 mL / NET: 35 mL    acetaminophen   Tablet 650 milliGRAM(s) Oral every 6 hours PRN  acetaminophen   Tablet. 650 milliGRAM(s) Oral every 6 hours PRN  acetaminophen  IVPB. 1000 milliGRAM(s) IV Intermittent once  cholecalciferol 1000 Unit(s) Oral daily  dexamethasone     Tablet 4 milliGRAM(s) Oral every 6 hours  docusate sodium 100 milliGRAM(s) Oral three times a day  HYDROmorphone  Injectable 0.25 milliGRAM(s) IV Push every 10 minutes PRN  HYDROmorphone  Injectable 0.5 milliGRAM(s) IV Push every 10 minutes PRN  levETIRAcetam 1000 milliGRAM(s) Oral two times a day  metoprolol tartrate 25 milliGRAM(s) Oral two times a day  nafcillin  IVPB 2 Gram(s) IV Intermittent every 6 hours  ondansetron Injectable 4 milliGRAM(s) IV Push every 6 hours PRN  ondansetron Injectable 4 milliGRAM(s) IV Push once PRN  oxyCODONE    IR 5 milliGRAM(s) Oral every 4 hours PRN  oxyCODONE    IR 10 milliGRAM(s) Oral every 4 hours PRN  sertraline 100 milliGRAM(s) Oral daily  sodium chloride 0.9% with potassium chloride 20 mEq/L 1000 milliLiter(s) IV Continuous <Continuous>      EXAMINATION:  General:  calm  HEENT:  MMM  Neuro:  awake, alert, oriented, follows commands, 1/5 proximal movement of LUE, full strength in RUE, BLE 0/5 -- clonus, hyperreflexia  Cards:  RRR  Respiratory:  no respiratory distress  Adomen:  soft  Extremities:  Swelling in LUE, significantly improved, has good radial pulses   Skin:  warm/dry      Assessment and Plan:   · Assessment		  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    NEURO:  Neurochecks q4h, CT head in pm with post-op changes, MRI post-op, Pain control, histroy of seizures, increase keppra 1000 mg daily BID from 750 mg BID, EEG moniotring, dexamethasone 4mg q6h  Activity: [] OOB as tolerated [] Bedrest [X] PT [X] OT [] PMNR    PULM:  Incentive spirometry, mobilize as tolerated    CV: Metoprolol 25mg bid (home med)  -150mmHg    RENAL:  IVF until good PO intake, d/c goncalves     GI:  Diet: Dysphagia screen and then advance diet as tolerated  GI prophylaxis [X] not indicated [] PPI [] other:  Bowel regimen [] colace [] senna [] other:    ENDO:   Goal euglycemia (-180)    HEME/ONC:  VTE prophylaxis: [S] SCDs [] chemoprophylaxis [] hold chemoprophylaxis due to: recent post op [] high risk of DVT/PE on admission due to: immobility, bed ridden start lovenox 40 mg sc qhs     ID:  Maura-op antibiotics    MISC: PACU to floor    SOCIAL/FAMILY:  [] awaiting [] updated at bedside [] family meeting    CODE STATUS:  [X] Full Code [] DNR [] DNI [] Palliative/Comfort Care    DISPOSITION:  [X] ICU [] Stroke Unit [] Floor [] EMU [] RCU [] PCU    [] Patient is at high risk of neurologic deterioration/death due to: ICH, seizures       Contact: [phone] O: had twitching of the left arm            REVIEW OF SYSTEMS: [ ] Unable to Assess due to neurologic exam   [x ] All ROS addressed below are non-contributory, except:  Neuro: [x ] Headache [ ] Back pain [ ] Numbness [ ] Weakness [ ] Ataxia [ ] Dizziness [ ] Aphasia [ ] Dysarthria [ ] Visual disturbance  Resp: [ ] Shortness of breath/dyspnea, [ ] Orthopnea [ ] Cough  CV: [ ] Chest pain [ ] Palpitation [ ] Lightheadedness [ ] Syncope  Renal: [ ] Thirst [ ] Edema  GI: [ ] Nausea [ ] Emesis [ ] Abdominal pain [ ] Constipation [ ] Diarrhea  Hem: [ ] Hematemesis [ ] bright red blood per rectum  ID: [ ] Fever [ ] Chills [ ] Dysuria  ENT: [ ] Rhinorrhea          T(C): 36.6 (05-11-18 @ 08:00), Max: 36.9 (05-10-18 @ 19:00)  HR: 83 (05-11-18 @ 07:00) (62 - 83)  BP: 130/58 (05-11-18 @ 07:00) (120/63 - 168/77)  RR: 16 (05-11-18 @ 08:00) (14 - 20)  SpO2: 100% (05-11-18 @ 08:00) (99% - 100%)  05-10-18 @ 07:01  -  05-11-18 @ 07:00  --------------------------------------------------------  IN: 2725 mL / OUT: 1930 mL / NET: 795 mL    05-11-18 @ 07:01  -  05-11-18 @ 08:15  --------------------------------------------------------  IN: 75 mL / OUT: 40 mL / NET: 35 mL    acetaminophen   Tablet 650 milliGRAM(s) Oral every 6 hours PRN  acetaminophen   Tablet. 650 milliGRAM(s) Oral every 6 hours PRN  acetaminophen  IVPB. 1000 milliGRAM(s) IV Intermittent once  cholecalciferol 1000 Unit(s) Oral daily  dexamethasone     Tablet 4 milliGRAM(s) Oral every 6 hours  docusate sodium 100 milliGRAM(s) Oral three times a day  HYDROmorphone  Injectable 0.25 milliGRAM(s) IV Push every 10 minutes PRN  HYDROmorphone  Injectable 0.5 milliGRAM(s) IV Push every 10 minutes PRN  levETIRAcetam 1000 milliGRAM(s) Oral two times a day  metoprolol tartrate 25 milliGRAM(s) Oral two times a day  nafcillin  IVPB 2 Gram(s) IV Intermittent every 6 hours  ondansetron Injectable 4 milliGRAM(s) IV Push every 6 hours PRN  ondansetron Injectable 4 milliGRAM(s) IV Push once PRN  oxyCODONE    IR 5 milliGRAM(s) Oral every 4 hours PRN  oxyCODONE    IR 10 milliGRAM(s) Oral every 4 hours PRN  sertraline 100 milliGRAM(s) Oral daily  sodium chloride 0.9% with potassium chloride 20 mEq/L 1000 milliLiter(s) IV Continuous <Continuous>      EXAMINATION:  General:  calm  HEENT:  MMM  Neuro:  awake, alert, oriented, follows commands, 1/5 proximal movement of LUE, full strength in RUE, BLE 0/5 -- clonus, hyperreflexia  Cards:  RRR  Respiratory:  no respiratory distress  Adomen:  soft  Extremities:  Swelling in LUE, significantly improved, has good radial pulses   Skin:  warm/dry    labs and images reviewed       Assessment and Plan:   · Assessment		  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    NEURO:  Neurochecks q4h, CT head in pm with post-op changes, MRI post in 24 hrs, Pain control, histroy of seizures, increase keppra 1000 mg daily BID from 750 mg BID, EEG moniotring, dexamethasone 4mg q6h  Activity: [] OOB as tolerated [] Bedrest [X] PT [X] OT [] PMNR    PULM:  Incentive spirometry, mobilize as tolerated    CV: Metoprolol 25mg bid (home med)  -150mmHg    RENAL:  IVF until good PO intake, d/c goncalves     GI:  Diet: Dysphagia screen and then advance diet as tolerated  GI prophylaxis [X] not indicated [] PPI [] other:  Bowel regimen [] colace [] senna [] other:    ENDO:   Goal euglycemia (-180)    HEME/ONC:  VTE prophylaxis: [S] SCDs [] chemoprophylaxis [] hold chemoprophylaxis due to: recent post op [] high risk of DVT/PE on admission due to: immobility, bed ridden start lovenox 40 mg sc qhs     ID:  Maura-op antibiotics    MISC: PACU to floor    SOCIAL/FAMILY:  [] awaiting [] updated at bedside [] family meeting    CODE STATUS:  [X] Full Code [] DNR [] DNI [] Palliative/Comfort Care    DISPOSITION:  [] ICU [] Stroke Unit [x] Floor [] EMU [] RCU [] PCU    [] Patient is at high risk of neurologic deterioration/death due to: ICH, seizures       Contact: [phone]

## 2018-05-11 NOTE — PROGRESS NOTE ADULT - ASSESSMENT
67F s/p R stereotactic biopsy. Frozen consistent with glioma.   - q. 1 hour neuro checks  - Given overnight CTH, no CTH in AM needed  - Transfer to floor in AM   -PT/OT/PMR  - Pain control  - F/u final path

## 2018-05-12 DIAGNOSIS — D72.829 ELEVATED WHITE BLOOD CELL COUNT, UNSPECIFIED: ICD-10-CM

## 2018-05-12 DIAGNOSIS — D49.6 NEOPLASM OF UNSPECIFIED BEHAVIOR OF BRAIN: ICD-10-CM

## 2018-05-12 DIAGNOSIS — G35 MULTIPLE SCLEROSIS: ICD-10-CM

## 2018-05-12 DIAGNOSIS — F32.9 MAJOR DEPRESSIVE DISORDER, SINGLE EPISODE, UNSPECIFIED: ICD-10-CM

## 2018-05-12 DIAGNOSIS — I10 ESSENTIAL (PRIMARY) HYPERTENSION: ICD-10-CM

## 2018-05-12 LAB
ANION GAP SERPL CALC-SCNC: 12 MMOL/L — SIGNIFICANT CHANGE UP (ref 5–17)
BUN SERPL-MCNC: 11 MG/DL — SIGNIFICANT CHANGE UP (ref 7–23)
CALCIUM SERPL-MCNC: 9.1 MG/DL — SIGNIFICANT CHANGE UP (ref 8.4–10.5)
CHLORIDE SERPL-SCNC: 102 MMOL/L — SIGNIFICANT CHANGE UP (ref 96–108)
CO2 SERPL-SCNC: 27 MMOL/L — SIGNIFICANT CHANGE UP (ref 22–31)
CREAT SERPL-MCNC: 0.44 MG/DL — LOW (ref 0.5–1.3)
GLUCOSE SERPL-MCNC: 155 MG/DL — HIGH (ref 70–99)
HCT VFR BLD CALC: 38.7 % — SIGNIFICANT CHANGE UP (ref 34.5–45)
HGB BLD-MCNC: 12.6 G/DL — SIGNIFICANT CHANGE UP (ref 11.5–15.5)
MCHC RBC-ENTMCNC: 31.1 PG — SIGNIFICANT CHANGE UP (ref 27–34)
MCHC RBC-ENTMCNC: 32.6 GM/DL — SIGNIFICANT CHANGE UP (ref 32–36)
MCV RBC AUTO: 95.5 FL — SIGNIFICANT CHANGE UP (ref 80–100)
PLATELET # BLD AUTO: 272 K/UL — SIGNIFICANT CHANGE UP (ref 150–400)
POTASSIUM SERPL-MCNC: 3.7 MMOL/L — SIGNIFICANT CHANGE UP (ref 3.5–5.3)
POTASSIUM SERPL-SCNC: 3.7 MMOL/L — SIGNIFICANT CHANGE UP (ref 3.5–5.3)
RBC # BLD: 4.05 M/UL — SIGNIFICANT CHANGE UP (ref 3.8–5.2)
RBC # FLD: 11.6 % — SIGNIFICANT CHANGE UP (ref 10.3–14.5)
SODIUM SERPL-SCNC: 141 MMOL/L — SIGNIFICANT CHANGE UP (ref 135–145)
WBC # BLD: 14 K/UL — HIGH (ref 3.8–10.5)
WBC # FLD AUTO: 14 K/UL — HIGH (ref 3.8–10.5)

## 2018-05-12 PROCEDURE — 95951: CPT | Mod: 26

## 2018-05-12 PROCEDURE — 99223 1ST HOSP IP/OBS HIGH 75: CPT

## 2018-05-12 RX ORDER — POTASSIUM CHLORIDE 20 MEQ
20 PACKET (EA) ORAL ONCE
Qty: 0 | Refills: 0 | Status: COMPLETED | OUTPATIENT
Start: 2018-05-12 | End: 2018-05-12

## 2018-05-12 RX ORDER — DEXAMETHASONE 0.5 MG/5ML
4 ELIXIR ORAL EVERY 6 HOURS
Qty: 0 | Refills: 0 | Status: DISCONTINUED | OUTPATIENT
Start: 2018-05-12 | End: 2018-05-14

## 2018-05-12 RX ORDER — DEXAMETHASONE 0.5 MG/5ML
4 ELIXIR ORAL EVERY 8 HOURS
Qty: 0 | Refills: 0 | Status: DISCONTINUED | OUTPATIENT
Start: 2018-05-12 | End: 2018-05-12

## 2018-05-12 RX ADMIN — Medication 25 MILLIGRAM(S): at 05:51

## 2018-05-12 RX ADMIN — Medication 100 MILLIGRAM(S): at 21:02

## 2018-05-12 RX ADMIN — Medication 20 MILLIEQUIVALENT(S): at 18:53

## 2018-05-12 RX ADMIN — Medication 100 MILLIGRAM(S): at 05:51

## 2018-05-12 RX ADMIN — Medication 4 MILLIGRAM(S): at 14:31

## 2018-05-12 RX ADMIN — Medication 100 MILLIGRAM(S): at 14:31

## 2018-05-12 RX ADMIN — Medication 4 MILLIGRAM(S): at 23:52

## 2018-05-12 RX ADMIN — Medication 4 MILLIGRAM(S): at 00:06

## 2018-05-12 RX ADMIN — Medication 25 MILLIGRAM(S): at 18:58

## 2018-05-12 RX ADMIN — Medication 1000 UNIT(S): at 14:31

## 2018-05-12 RX ADMIN — ENOXAPARIN SODIUM 40 MILLIGRAM(S): 100 INJECTION SUBCUTANEOUS at 21:02

## 2018-05-12 RX ADMIN — SENNA PLUS 2 TABLET(S): 8.6 TABLET ORAL at 21:02

## 2018-05-12 RX ADMIN — SERTRALINE 100 MILLIGRAM(S): 25 TABLET, FILM COATED ORAL at 10:32

## 2018-05-12 RX ADMIN — Medication 4 MILLIGRAM(S): at 18:53

## 2018-05-12 RX ADMIN — Medication 4 MILLIGRAM(S): at 05:52

## 2018-05-12 RX ADMIN — LEVETIRACETAM 1000 MILLIGRAM(S): 250 TABLET, FILM COATED ORAL at 05:51

## 2018-05-12 RX ADMIN — LEVETIRACETAM 1000 MILLIGRAM(S): 250 TABLET, FILM COATED ORAL at 18:54

## 2018-05-12 NOTE — PROVIDER CONTACT NOTE (OTHER) - BACKGROUND
Pt s/p crani and biopsy of right frontal lesion on 5/10/18. Pt has a history of left upper extremity focal seizures.

## 2018-05-12 NOTE — OCCUPATIONAL THERAPY INITIAL EVALUATION ADULT - ADL RETRAINING, OT EVAL
GOAL: Pt will require min A for self feeding, and grooming in 4 weeks. Pt will require mod A for dressing and toileting in 4 weeks.

## 2018-05-12 NOTE — OCCUPATIONAL THERAPY INITIAL EVALUATION ADULT - STRENGTHENING, PT EVAL
GOAL: Pt will improve RUE strength to 5/5 and LUE strength to 2-/5 to increase independence in ADLs within 4 weeks

## 2018-05-12 NOTE — PHYSICAL THERAPY INITIAL EVALUATION ADULT - GENERAL OBSERVATIONS, REHAB EVAL
68y/o F presents to [facility] for a Stereotactic Biopsy for Tumor PC on 5/10/18. 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 Abnormality seen on recent MRI brain (large multinodular enhancing lesion) from 4/23/18. Pt wheelchair bound since 1987 patient supine in bed with dtr at bedside, vss, staples on frontal aspect, left side mild facial weakness

## 2018-05-12 NOTE — CONSULT NOTE ADULT - PROBLEM SELECTOR RECOMMENDATION 9
-s/p right craniotomy and biopsy of brain tumor on 5/10  -likely glioma, f/up final path  -c/w Decadron   -c/w Keppra  -pain control   -PT/OT

## 2018-05-12 NOTE — PROGRESS NOTE ADULT - ASSESSMENT
HPI:  67yr old female presents to [facility] for a Stereotactic Biopsy for Tumor PC on 5/10/18. 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 Abnormality seen on recent MRI brain (large multinodular enhancing lesion) from 4/23/18. Pt wheelchair bound since 1987. Her baseline 6 months ago was paraplegic, incontinent, with little function of her right UE. Since her seizure in April, she has had reduced function of left hand significantly affecting any independence. Denies chest pain or SOB. (07 May 2018 10:32)      s/p right craniotomy and biopsy of brain tumor- 5/10     1 follow up eeg report   2 continue keppra 1g BID   3 decadron to 4q8 today   4 prn pain meds   5 dvt ppx sql scds   6 continue metoprolol   7 regular diet   8 stool softeners   9 dispo :p

## 2018-05-12 NOTE — OCCUPATIONAL THERAPY INITIAL EVALUATION ADULT - RANGE OF MOTION EXAMINATION, UPPER EXTREMITY
Left UE Passive ROM was WFL  (within functional limits)/Right UE Active Assistive ROM was WFL  (within functional limits)

## 2018-05-12 NOTE — CONSULT NOTE ADULT - SUBJECTIVE AND OBJECTIVE BOX
HPI:  67yr old female h/o depression, HTN, advanced multiple sclerosis, wheelchair bound since 1987, seizures, admitted for stereotactic biopsy for Tumor PC on 5/10/18. Patient started having simple partial seizures of left upper extremity since April 2018. MRI of brain showed a right frontal mass. Pt underwent right craniotomy and biopsy of brain tumor on 5/10. Pt denies any pain today. Pt does complaint of LUE seizures lasting a few seconds. Pt states that she has been having visual hallucinations since last night after dose of Keppra was increased. Hallucinations mostly involve images of fabric, the last one she had was of a beach towel. In addition, patient c/o RUE stiffness, improves with support to RUE.     PAST MEDICAL & SURGICAL HISTORY:  Neoplasm of unspecified behavior of brain  Seizures: focal left UE motor seizures  Wheelchair bound  Multiple sclerosis  Hypertension  History of tonsillectomy: at age 7      Review of Systems:   CONSTITUTIONAL: No fever, weight loss, or fatigue  EYES: No eye pain, visual disturbances, or discharge  ENMT:  No difficulty hearing, tinnitus, vertigo; No sinus or throat pain  NECK: No pain or stiffness  BREASTS: No pain, masses, or nipple discharge  RESPIRATORY: No cough, wheezing, chills or hemoptysis; No shortness of breath  CARDIOVASCULAR: No chest pain, palpitations, dizziness, or leg swelling  GASTROINTESTINAL: No abdominal or epigastric pain. No nausea, vomiting, or hematemesis; No diarrhea or constipation. No melena or hematochezia.  GENITOURINARY: No dysuria, frequency, hematuria. +Urinary incontinence  NEUROLOGICAL: No headaches, memory loss, numbness. +Paraplegic.   SKIN: No itching, burning, rashes, or lesions   LYMPH NODES: No enlarged glands  ENDOCRINE: No heat or cold intolerance; No hair loss  MUSCULOSKELETAL: No joint pain or swelling; +RUE stiff.   PSYCHIATRIC: +depression. +Visual hallucinations.   HEME/LYMPH: No easy bruising, or bleeding gums  ALLERY AND IMMUNOLOGIC: No hives or eczema    Allergies    latex (Urticaria)  shellfish (Hives; Swelling)  sulfa drugs (Fever; Rash)      Social History:   No alcohol  No smoking     FAMILY HISTORY:  Family history of rheumatic fever (Father)  Family history of hypertension (Mother)      MEDICATIONS  (STANDING):  cholecalciferol 1000 Unit(s) Oral daily  dexamethasone     Tablet 4 milliGRAM(s) Oral every 8 hours  docusate sodium 100 milliGRAM(s) Oral three times a day  enoxaparin Injectable 40 milliGRAM(s) SubCutaneous at bedtime  levETIRAcetam 1000 milliGRAM(s) Oral two times a day  metoprolol tartrate 25 milliGRAM(s) Oral two times a day  senna 2 Tablet(s) Oral at bedtime  sertraline 100 milliGRAM(s) Oral daily    MEDICATIONS  (PRN):  acetaminophen   Tablet 650 milliGRAM(s) Oral every 6 hours PRN For Temp greater than 38 C (100.4 F)  acetaminophen   Tablet. 650 milliGRAM(s) Oral every 6 hours PRN Mild Pain (1 - 3)  ondansetron Injectable 4 milliGRAM(s) IV Push every 6 hours PRN Nausea and/or Vomiting  oxyCODONE    IR 5 milliGRAM(s) Oral every 4 hours PRN Moderate Pain (4 - 6)  oxyCODONE    IR 10 milliGRAM(s) Oral every 4 hours PRN Severe Pain (7 - 10)      Vital Signs Last 24 Hrs  T(C): 37.1 (12 May 2018 12:21), Max: 37.1 (11 May 2018 16:00)  T(F): 98.8 (12 May 2018 12:21), Max: 98.8 (11 May 2018 16:00)  HR: 74 (12 May 2018 12:21) (72 - 85)  BP: 144/60 (12 May 2018 12:21) (107/60 - 157/77)  BP(mean): 79 (11 May 2018 20:00) (77 - 79)  RR: 18 (12 May 2018 12:21) (18 - 18)  SpO2: 96% (12 May 2018 12:21) (96% - 100%)  CAPILLARY BLOOD GLUCOSE        I&O's Summary    11 May 2018 07:01  -  12 May 2018 07:00  --------------------------------------------------------  IN: 925 mL / OUT: 1175 mL / NET: -250 mL    12 May 2018 07:01  -  12 May 2018 15:27  --------------------------------------------------------  IN: 360 mL / OUT: 0 mL / NET: 360 mL        PHYSICAL EXAM:  GENERAL: NAD  HEAD:  Atraumatic, Normocephalic  EYES: PERRLA, conjunctiva and sclera clear  NECK: Supple, No JVD  CHEST/LUNG: Clear to auscultation bilaterally; No wheeze  HEART: Regular rate and rhythm; No murmurs, rubs, or gallops  ABDOMEN: Soft, Nontender, Nondistended; Bowel sounds present  EXTREMITIES:  2+ Peripheral Pulses, No clubbing, cyanosis, or edema  PSYCH: AAOx3  NEUROLOGY: Left facial weakness, moves RUE against gravity, LUE weakness, unable to move LE's   SKIN: L heel ulcer     LABS:                        12.6   14.0  )-----------( 272      ( 12 May 2018 11:04 )             38.7     05-12    141  |  102  |  11  ----------------------------<  155<H>  3.7   |  27  |  0.44<L>    Ca    9.1      12 May 2018 11:04  Phos  3.5     05-10  Mg     1.7     05-10                RADIOLOGY & ADDITIONAL TESTS:    Imaging Personally Reviewed:  CT head reviewed: post-op changes     CXR reviewed: clear lungs     EEG:  EEG provides evidence of a structural lesion in the right central region.  There were no epileptiform abnormalities recorded.      Consultant(s) Notes Reviewed:      Care Discussed with Consultants/Other Providers: Spoke with neurosurgery, will monitor BP closely

## 2018-05-12 NOTE — PHYSICAL THERAPY INITIAL EVALUATION ADULT - CRITERIA FOR SKILLED THERAPEUTIC INTERVENTIONS
rehab potential/therapy frequency/impairments found/anticipated discharge recommendation/functional limitations in following categories/risk reduction/prevention/anticipated equipment needs at discharge

## 2018-05-12 NOTE — PHYSICAL THERAPY INITIAL EVALUATION ADULT - RANGE OF MOTION EXAMINATION, REHAB EVAL
Left LE ROM was WNL (within normal limits)/Right LE ROM was WNL (within normal limits)/no arom with left upper and b/l llower/Left UE ROM was WNL (within normal limits)

## 2018-05-12 NOTE — PROVIDER CONTACT NOTE (OTHER) - SITUATION
Upon draining goncalves, pt noted to be incontinent, goncalves had 100cc. Goncalves was #2 post preop

## 2018-05-12 NOTE — PHYSICAL THERAPY INITIAL EVALUATION ADULT - ADDITIONAL COMMENTS
patient lives in a private house with her spouse and s aide few hours in a day to assist for adls , spouse assist most of the times, has wilma lift to transfers bed to chair , motorized scooter, manual wheelchair. has not ambulated from past 15 years and more , left upper was moving well till past jan 2018 patient added, and was ablw to work on computer and move manual wheelchair

## 2018-05-12 NOTE — CONSULT NOTE ADULT - PROBLEM SELECTOR RECOMMENDATION 2
-d/t right frontal mass  -EEG with evidence of structural lesion in R central region, no seizures noted  -c/w Keppra 1000mg q12h

## 2018-05-12 NOTE — PROGRESS NOTE ADULT - SUBJECTIVE AND OBJECTIVE BOX
SUBJECTIVE: Patient was seen and evaluated at bedside. Patient is resting comfortably in bed and is in no new acute distress.     OVERNIGHT EVENTS: none      Vital Signs Last 24 Hrs  T(C): 36.9 (12 May 2018 07:49), Max: 37.1 (11 May 2018 16:00)  T(F): 98.5 (12 May 2018 07:49), Max: 98.8 (11 May 2018 16:00)  HR: 72 (12 May 2018 07:49) (72 - 85)  BP: 147/77 (12 May 2018 07:49) (107/60 - 157/77)  BP(mean): 79 (11 May 2018 20:00) (77 - 87)  RR: 18 (12 May 2018 07:49) (16 - 18)  SpO2: 96% (12 May 2018 04:36) (96% - 100%)    PHYSICAL EXAM:    General: No Acute Distress     Neurological: Awake, alert oriented to person, place and time, Following Commands, PERRL, EOMI, Face Symmetrical, Speech Fluent, Moving all extremities, Muscle Strength normal in all four extremities, No Drift, Sensation to Light Touch Intact    Pulmonary: Clear to Auscultation, No Rales, No Rhonchi, No Wheezes     Cardiovascular: S1, S2, Regular Rate and Rhythm     Gastrointestinal: Soft, Nontender, Nondistended     Incision:     LABS:                        13.6   10.4  )-----------( 273      ( 10 May 2018 21:24 )             40.8    05-10    139  |  101  |  17  ----------------------------<  160<H>  4.3   |  24  |  0.61    Ca    9.4      10 May 2018 21:24  Phos  3.5     05-10  Mg     1.7     05-10          05-11 @ 07:01  -  05-12 @ 07:00  --------------------------------------------------------  IN: 925 mL / OUT: 1175 mL / NET: -250 mL      DRAINS:     MEDICATIONS:  Antibiotics:    Neuro:  acetaminophen   Tablet 650 milliGRAM(s) Oral every 6 hours PRN For Temp greater than 38 C (100.4 F)  acetaminophen   Tablet. 650 milliGRAM(s) Oral every 6 hours PRN Mild Pain (1 - 3)  levETIRAcetam 1000 milliGRAM(s) Oral two times a day  ondansetron Injectable 4 milliGRAM(s) IV Push every 6 hours PRN Nausea and/or Vomiting  oxyCODONE    IR 5 milliGRAM(s) Oral every 4 hours PRN Moderate Pain (4 - 6)  oxyCODONE    IR 10 milliGRAM(s) Oral every 4 hours PRN Severe Pain (7 - 10)  sertraline 100 milliGRAM(s) Oral daily    Cardiac:  metoprolol tartrate 25 milliGRAM(s) Oral two times a day    Pulm:    GI/:  docusate sodium 100 milliGRAM(s) Oral three times a day  senna 2 Tablet(s) Oral at bedtime    Other:   cholecalciferol 1000 Unit(s) Oral daily  dexamethasone     Tablet 4 milliGRAM(s) Oral every 6 hours  enoxaparin Injectable 40 milliGRAM(s) SubCutaneous at bedtime    DIET: [] Regular [] CCD [] Renal [] Puree [] Dysphagia [] Tube Feeds:     IMAGING: SUBJECTIVE: Patient was seen and evaluated at bedside. Patient is resting comfortably in bed and is in no new acute distress.     OVERNIGHT EVENTS: none      Vital Signs Last 24 Hrs  T(C): 36.9 (12 May 2018 07:49), Max: 37.1 (11 May 2018 16:00)  T(F): 98.5 (12 May 2018 07:49), Max: 98.8 (11 May 2018 16:00)  HR: 72 (12 May 2018 07:49) (72 - 85)  BP: 147/77 (12 May 2018 07:49) (107/60 - 157/77)  BP(mean): 79 (11 May 2018 20:00) (77 - 87)  RR: 18 (12 May 2018 07:49) (16 - 18)  SpO2: 96% (12 May 2018 04:36) (96% - 100%)    PHYSICAL EXAM:    General: No Acute Distress     Neurological: Awake, alert oriented to person, place and time, left facial weakness, follows commands on right upper -antigravity, left upper trace movement, b/l lower nothing.   Pulmonary: Clear to Auscultation, No Rales, No Rhonchi, No Wheezes     Cardiovascular: S1, S2, Regular Rate and Rhythm     Gastrointestinal: Soft, Nontender, Nondistended     Incision: clean     LABS:                        13.6   10.4  )-----------( 273      ( 10 May 2018 21:24 )             40.8    05-10    139  |  101  |  17  ----------------------------<  160<H>  4.3   |  24  |  0.61    Ca    9.4      10 May 2018 21:24  Phos  3.5     05-10  Mg     1.7     05-10          05-11 @ 07:01  -  05-12 @ 07:00  --------------------------------------------------------  IN: 925 mL / OUT: 1175 mL / NET: -250 mL      DRAINS:     MEDICATIONS:  Antibiotics:    Neuro:  acetaminophen   Tablet 650 milliGRAM(s) Oral every 6 hours PRN For Temp greater than 38 C (100.4 F)  acetaminophen   Tablet. 650 milliGRAM(s) Oral every 6 hours PRN Mild Pain (1 - 3)  levETIRAcetam 1000 milliGRAM(s) Oral two times a day  ondansetron Injectable 4 milliGRAM(s) IV Push every 6 hours PRN Nausea and/or Vomiting  oxyCODONE    IR 5 milliGRAM(s) Oral every 4 hours PRN Moderate Pain (4 - 6)  oxyCODONE    IR 10 milliGRAM(s) Oral every 4 hours PRN Severe Pain (7 - 10)  sertraline 100 milliGRAM(s) Oral daily    Cardiac:  metoprolol tartrate 25 milliGRAM(s) Oral two times a day    Pulm:    GI/:  docusate sodium 100 milliGRAM(s) Oral three times a day  senna 2 Tablet(s) Oral at bedtime    Other:   cholecalciferol 1000 Unit(s) Oral daily  dexamethasone     Tablet 4 milliGRAM(s) Oral every 6 hours  enoxaparin Injectable 40 milliGRAM(s) SubCutaneous at bedtime    DIET: [] Regular [] CCD [] Renal [] Puree [] Dysphagia [] Tube Feeds: regular     IMAGING: no new imaging today

## 2018-05-12 NOTE — PHYSICAL THERAPY INITIAL EVALUATION ADULT - PRECAUTIONS/LIMITATIONS, REHAB EVAL
Her baseline 6 months ago was paraplegic, incontinent, with little function of her right UE. Since her seizure in April, she has had reduced function of left hand significantly affecting any independence. Denies chest pain or SOB. Her baseline 6 months ago was paraplegic, incontinent, with little function of her right UE. Since her seizure in April, she has had reduced function of left hand significantly affecting any independence. Denies chest pain or SOB./fall precautions

## 2018-05-12 NOTE — CONSULT NOTE ADULT - PROBLEM SELECTOR RECOMMENDATION 3
-on Pindolol at home, BP not well controlled on this agent as per patient   -continue Metoprolol 25mg PO q12h, will titrate as needed   -monitor BP  -recommend discharging patient on Metoprolol instead of Pindolol

## 2018-05-12 NOTE — PHYSICAL THERAPY INITIAL EVALUATION ADULT - BALANCE TRAINING, PT EVAL
Pt's static/dynamic sitting/standing balance will improve to normal to increase stability, and decrease risk for falls when ambulating or performing ADL's

## 2018-05-12 NOTE — OCCUPATIONAL THERAPY INITIAL EVALUATION ADULT - LIVES WITH, PROFILE
lives with spouse in apartment, pt is baseline dependent for all transfers, able to push manual WC at baseline, independent with grooming, self feeding and utilizing computer , able to assist with dressing, toileting and bathing until recent decrease in UE strength

## 2018-05-12 NOTE — OCCUPATIONAL THERAPY INITIAL EVALUATION ADULT - FINE MOTOR COORDINATION TRAINING, OT EVAL
GOAL: Pt will improve finger to thumb opposition 5/5 digits on R hand in 2/4 OT sessions within 4 weeks

## 2018-05-12 NOTE — OCCUPATIONAL THERAPY INITIAL EVALUATION ADULT - DIAGNOSIS, OT EVAL
Patient with decreased ADL status and impairments with functional mobility due to Pt demonstrated decreased strength and fine motor coordination impacting pt's ability to perform ADLs.

## 2018-05-12 NOTE — PROVIDER CONTACT NOTE (OTHER) - ASSESSMENT
Pt alert and oriented x4, SE on RUE, NM on LUE and B/L LE. PERRLA, stable left facial droop, tongue midline.

## 2018-05-12 NOTE — EEG REPORT - NS EEG TEXT BOX
[facility]  Report of Continuous EEG with Video    [facility]: [address], [address], [place] [address], Phone: [phone]  [facility]: [address] [place] [address], [place] [address], Phone: [phone]  Office: [address] [facility], [facility] [address], [place] [address], Phone: [phone]    Patient Name: Mala Ordonez    Age: 67 y  : 1950  Patient ID: -, MRN #: MR# [id number], Location: [address] [place] [address]  Referring Physician: -    EEG #: [id number]  Study Date: 2018-2018		    Technical Information:					  On Instrument: [id number]  Placement and Labeling of Electrodes:  The EEG was performed utilizing 20 channels referential EEG connections (coronal over temporal over parasagittal montage) using all standard 10-20 electrode placements with EKG.  Recording was at a sampling rate of 256 samples per second per channel.  Time synchronized digital video recording was done simultaneously with EEG recording.  A low light infrared camera was used for low light recording.  Brenden and seizure detection algorithms were utilized.    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 Abnormality seen on MRI brain (large multinodular enhancing lesion) from 18, wheelchair bound    -    Medication	  No Data.	    Study Interpretation:    FINDINGS:  The background was continuous, spontaneously variable and reactive. During wakefulness, the posteriorly dominant rhythm consisted of symmetric, well-modulated 10 Hz activity, with amplitude to 30 uV, that attenuated to eye opening. Low amplitude frontal beta was noted in wakefulness.    Generalized Slowing:  No generalized background slowing was present.    Focal Slowing:   -Continuous Slowing, Regional, Right Central (delta and theta)  -Intermittent Slowing, Lateralized, Right hemisphere (delta)    Sleep Background:  Drowsiness was characterized by fragmentation, attenuation, and slowing of the background activity.    Stage II sleep transients were not recorded.    Other Non-Epileptiform Findings:  None were present.    Interictal Epileptiform Activity:   None were present.    Events:  Clinical events: None recorded.  Seizures: None recorded.    Activation Procedures:   Hyperventilation was not performed.    Photic stimulation was not performed.    Artifacts:  Intermittent myogenic and movement artifacts were noted.    ECG:  The heart rate on single channel ECG was predominantly between 60-80 BPM.    EEG Summary / Classification:  Abnormal EEG in the awake, drowsy states.  -Continuous Slowing, Regional, Right Central (delta and theta)  -Intermittent Slowing, Lateralized, Right hemisphere (delta)    EEG Impression / Clinical Correlate:  This EEG provides evidence of a structural lesion in the right central region.  There were no epileptiform abnormalities recorded.        Can Good MD  Attending Physician, [facility] [place]

## 2018-05-12 NOTE — OCCUPATIONAL THERAPY INITIAL EVALUATION ADULT - PRECAUTIONS/LIMITATIONS, REHAB EVAL
fall precautions/surgical precautions/PMH notable for chronic progressive multiple sclerosis, and new onset of focal left UE motor seizures from 1/18'. Pt w/c bound since '87. Her baseline 6 months ago was paraplegic, incontinent, with little function of her right UE. Since her seizure in April, she has had reduced function of left hand significantly affecting any independence.

## 2018-05-12 NOTE — CONSULT NOTE ADULT - ASSESSMENT
67yr old female h/o depression, HTN, advanced multiple sclerosis, wheelchair bound since 1987, recent onset LUE seizures and found to have a right frontal mass on MRI brain, s/p right craniotomy and biopsy of brain mass on 5/10

## 2018-05-12 NOTE — OCCUPATIONAL THERAPY INITIAL EVALUATION ADULT - ANTICIPATED DISCHARGE DISPOSITION, OT EVAL
Home with home OT for home safety evaluation, ADLs, fine motor coordination and strength. Home with assist for ADLs as prior. Acute Rehab Subacute Rehab

## 2018-05-12 NOTE — PHYSICAL THERAPY INITIAL EVALUATION ADULT - PERTINENT HX OF CURRENT PROBLEM, REHAB EVAL
68y/o F presents to [facility] for a Stereotactic Biopsy for Tumor PC on 5/10/18. 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 Abnormality seen on recent MRI brain (large multinodular enhancing lesion) from 4/23/18. Pt wheelchair bound since 1987

## 2018-05-12 NOTE — OCCUPATIONAL THERAPY INITIAL EVALUATION ADULT - ADDITIONAL COMMENTS
MRI Brain 5/9-Findings suspicious of neoplasm in a patient with known multiple sclerosis. Lesion measures 4.2 cm AP x 3.5 cm craniocaudal x 3.3 cm transverse dimension. There is mass effect on the right lateral ventricle appreciated. Enhancement is noted within the lesion with a   somewhat lobular appearance. Functional imaging is obtained for the   purposes of operative planning. The lesion sits right on the motor strip   on the right side consistent with history of left hemiparesis

## 2018-05-13 PROCEDURE — 95951: CPT | Mod: 26

## 2018-05-13 RX ORDER — POLYETHYLENE GLYCOL 3350 17 G/17G
17 POWDER, FOR SOLUTION ORAL ONCE
Qty: 0 | Refills: 0 | Status: COMPLETED | OUTPATIENT
Start: 2018-05-13 | End: 2018-05-13

## 2018-05-13 RX ORDER — LEVETIRACETAM 250 MG/1
1250 TABLET, FILM COATED ORAL
Qty: 0 | Refills: 0 | Status: DISCONTINUED | OUTPATIENT
Start: 2018-05-13 | End: 2018-05-15

## 2018-05-13 RX ADMIN — Medication 25 MILLIGRAM(S): at 05:53

## 2018-05-13 RX ADMIN — LEVETIRACETAM 1000 MILLIGRAM(S): 250 TABLET, FILM COATED ORAL at 17:54

## 2018-05-13 RX ADMIN — Medication 100 MILLIGRAM(S): at 05:53

## 2018-05-13 RX ADMIN — Medication 25 MILLIGRAM(S): at 17:54

## 2018-05-13 RX ADMIN — Medication 4 MILLIGRAM(S): at 17:54

## 2018-05-13 RX ADMIN — LEVETIRACETAM 1000 MILLIGRAM(S): 250 TABLET, FILM COATED ORAL at 05:53

## 2018-05-13 RX ADMIN — Medication 4 MILLIGRAM(S): at 12:25

## 2018-05-13 RX ADMIN — SERTRALINE 100 MILLIGRAM(S): 25 TABLET, FILM COATED ORAL at 12:25

## 2018-05-13 RX ADMIN — ENOXAPARIN SODIUM 40 MILLIGRAM(S): 100 INJECTION SUBCUTANEOUS at 21:56

## 2018-05-13 RX ADMIN — Medication 100 MILLIGRAM(S): at 21:56

## 2018-05-13 RX ADMIN — SENNA PLUS 2 TABLET(S): 8.6 TABLET ORAL at 21:56

## 2018-05-13 RX ADMIN — Medication 1000 UNIT(S): at 12:25

## 2018-05-13 RX ADMIN — POLYETHYLENE GLYCOL 3350 17 GRAM(S): 17 POWDER, FOR SOLUTION ORAL at 14:02

## 2018-05-13 RX ADMIN — Medication 4 MILLIGRAM(S): at 05:53

## 2018-05-13 NOTE — EEG REPORT - NS EEG TEXT BOX
[facility]  Report of Continuous EEG with Video    [facility]: [address], [address], [place] [address], Phone: [phone]  [facility]: [address] [place] [address], [place] [address], Phone: [phone]  Office: 1 [facility], [facility] [address], [place] [address], Phone: [phone]    Patient Name: Mala Ordonez    Age: 67 y  : 1950  Patient ID: -, MRN #: MR# [id number], Location: [address] [place] [address]  Referring Physician: -    EEG #: [id number]  Study Date: 2018-2018		    Technical Information:					  On Instrument: [id number]  Placement and Labeling of Electrodes:  The EEG was performed utilizing 20 channels referential EEG connections (coronal over temporal over parasagittal montage) using all standard 10-20 electrode placements with EKG.  Recording was at a sampling rate of 256 samples per second per channel.  Time synchronized digital video recording was done simultaneously with EEG recording.  A low light infrared camera was used for low light recording.  Brenden and seizure detection algorithms were utilized.    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 Abnormality seen on MRI brain (large multinodular enhancing lesion) from 18, wheelchair bound    -    Medication	  No Data.	    Study Interpretation:    FINDINGS:  The background was continuous, spontaneously variable and reactive. During wakefulness, the posteriorly dominant rhythm consisted of symmetric, well-modulated 10 Hz activity, with amplitude to 30 uV, that attenuated to eye opening. Low amplitude frontal beta was noted in wakefulness.    Generalized Slowing:  No generalized background slowing was present.    Focal Slowing:   -Continuous Slowing, Regional, Right Central (delta and theta)  -Intermittent Slowing, Lateralized, Right hemisphere (delta)    Sleep Background:  Drowsiness was characterized by fragmentation, attenuation, and slowing of the background activity.    Stage II sleep transients were not recorded.    Other Non-Epileptiform Findings:  None were present.    Interictal Epileptiform Activity:   None were present.    Events:  Clinical events: None recorded.  Seizures: None recorded.    Activation Procedures:   Hyperventilation was not performed.    Photic stimulation was not performed.    Artifacts:  Intermittent myogenic and movement artifacts were noted.    ECG:  The heart rate on single channel ECG was predominantly between 60-80 BPM.    EEG Summary / Classification:  Abnormal EEG in the awake, drowsy states.  -Continuous Slowing, Regional, Right Central (delta and theta)  -Intermittent Slowing, Lateralized, Right hemisphere (delta)    EEG Impression / Clinical Correlate:  This EEG provides evidence of a structural lesion in the right central region.  There were no epileptiform abnormalities recorded.        Can Good MD  Attending Physician, [facility] [place]

## 2018-05-13 NOTE — PROGRESS NOTE ADULT - ASSESSMENT
ASSESMENT AND PLAN: POD #3 s/p resection of tumor    NEURO: cont decadron, cont keppra, pain control, OT/PT    PULM: IS    CV: cont HTN meds    ENDO: no active issues    HEME/ONC: stable                     DVT ppx: lovenox    ID: no active issues    GI:  cont colace    DISCHARGE PLANNING: OT saw pt, to be seen by PT and PMR tomorrow

## 2018-05-13 NOTE — PROVIDER CONTACT NOTE (OTHER) - ASSESSMENT
0752 v/s: HR 63, /70, Temp 98.2 F, RR 18, SPO2 99%. Pt asymptomatic. Pt voiding in diaper. No other s/s of infection at this time.

## 2018-05-13 NOTE — PROGRESS NOTE ADULT - SUBJECTIVE AND OBJECTIVE BOX
SUBJECTIVE: Pt seen and examined at bedside. No new complaints.    OVERNIGHT EVENTS:     Vital Signs Last 24 Hrs  T(C): 36.8 (13 May 2018 07:50), Max: 36.8 (12 May 2018 15:51)  T(F): 98.2 (13 May 2018 07:50), Max: 98.3 (12 May 2018 15:51)  HR: 63 (13 May 2018 07:50) (63 - 88)  BP: 127/70 (13 May 2018 07:50) (120/67 - 136/61)  BP(mean): --  RR: 18 (13 May 2018 07:50) (18 - 18)  SpO2: 99% (13 May 2018 07:50) (94% - 99%)    PHYSICAL EXAM:    General: No Acute Distress     Neurological: Awake, alert oriented to person, place and time, Following Commands, PERRL, L facial droop, RUE antigravity, LE plegia, Sensation to Light Touch Intact    Pulmonary: Unlabored, clear to Auscultation, No Rales, No Rhonchi, No Wheezes     Cardiovascular: No chest pain. S1, S2, Regular Rate and Rhythm     Gastrointestinal: Soft, Nontender, Nondistended     Incision:     LABS:                        12.6   14.0  )-----------( 272      ( 12 May 2018 11:04 )             38.7    05-12    141  |  102  |  11  ----------------------------<  155<H>  3.7   |  27  |  0.44<L>    Ca    9.1      12 May 2018 11:04          05-12 @ 07:01  -  05-13 @ 07:00  --------------------------------------------------------  IN: 600 mL / OUT: 0 mL / NET: 600 mL      MEDICATIONS:  Antibiotics:    Neuro:  acetaminophen   Tablet 650 milliGRAM(s) Oral every 6 hours PRN For Temp greater than 38 C (100.4 F)  acetaminophen   Tablet. 650 milliGRAM(s) Oral every 6 hours PRN Mild Pain (1 - 3)  levETIRAcetam 1000 milliGRAM(s) Oral two times a day  ondansetron Injectable 4 milliGRAM(s) IV Push every 6 hours PRN Nausea and/or Vomiting  oxyCODONE    IR 5 milliGRAM(s) Oral every 4 hours PRN Moderate Pain (4 - 6)  oxyCODONE    IR 10 milliGRAM(s) Oral every 4 hours PRN Severe Pain (7 - 10)  sertraline 100 milliGRAM(s) Oral daily    Cardiac:  metoprolol tartrate 25 milliGRAM(s) Oral two times a day    Pulm:    GI/:  docusate sodium 100 milliGRAM(s) Oral three times a day  senna 2 Tablet(s) Oral at bedtime    Other:   cholecalciferol 1000 Unit(s) Oral daily  dexamethasone     Tablet 4 milliGRAM(s) Oral every 6 hours  enoxaparin Injectable 40 milliGRAM(s) SubCutaneous at bedtime    DIET: [] Regular [] CCD [] Renal [] Puree [] Dysphagia [] Tube Feeds:     IMAGING: SUBJECTIVE: Pt seen and examined at bedside. No new complaints.    OVERNIGHT EVENTS:     Vital Signs Last 24 Hrs  T(C): 36.8 (13 May 2018 07:50), Max: 36.8 (12 May 2018 15:51)  T(F): 98.2 (13 May 2018 07:50), Max: 98.3 (12 May 2018 15:51)  HR: 63 (13 May 2018 07:50) (63 - 88)  BP: 127/70 (13 May 2018 07:50) (120/67 - 136/61)  BP(mean): --  RR: 18 (13 May 2018 07:50) (18 - 18)  SpO2: 99% (13 May 2018 07:50) (94% - 99%)    PHYSICAL EXAM:    General: No Acute Distress     Neurological: Awake, alert oriented to person, place and time, Following Commands, PERRL, L facial weakness, RUE antigravity, LUE minimal, BLE plegia, Sensation to Light Touch Intact    Pulmonary: Unlabored, clear to Auscultation, No Rales, No Rhonchi, No Wheezes     Cardiovascular: No chest pain. S1, S2, Regular Rate and Rhythm     Gastrointestinal: Soft, Nontender, Nondistended     Incision:     LABS:                        12.6   14.0  )-----------( 272      ( 12 May 2018 11:04 )             38.7    05-12    141  |  102  |  11  ----------------------------<  155<H>  3.7   |  27  |  0.44<L>    Ca    9.1      12 May 2018 11:04          05-12 @ 07:01  -  05-13 @ 07:00  --------------------------------------------------------  IN: 600 mL / OUT: 0 mL / NET: 600 mL      MEDICATIONS:  Antibiotics:    Neuro:  acetaminophen   Tablet 650 milliGRAM(s) Oral every 6 hours PRN For Temp greater than 38 C (100.4 F)  acetaminophen   Tablet. 650 milliGRAM(s) Oral every 6 hours PRN Mild Pain (1 - 3)  levETIRAcetam 1000 milliGRAM(s) Oral two times a day  ondansetron Injectable 4 milliGRAM(s) IV Push every 6 hours PRN Nausea and/or Vomiting  oxyCODONE    IR 5 milliGRAM(s) Oral every 4 hours PRN Moderate Pain (4 - 6)  oxyCODONE    IR 10 milliGRAM(s) Oral every 4 hours PRN Severe Pain (7 - 10)  sertraline 100 milliGRAM(s) Oral daily    Cardiac:  metoprolol tartrate 25 milliGRAM(s) Oral two times a day    Pulm:    GI/:  docusate sodium 100 milliGRAM(s) Oral three times a day  senna 2 Tablet(s) Oral at bedtime    Other:   cholecalciferol 1000 Unit(s) Oral daily  dexamethasone     Tablet 4 milliGRAM(s) Oral every 6 hours  enoxaparin Injectable 40 milliGRAM(s) SubCutaneous at bedtime    DIET: [] Regular [] CCD [] Renal [] Puree [] Dysphagia [] Tube Feeds:     IMAGING: SUBJECTIVE: Pt seen and examined at bedside. No new complaints.    OVERNIGHT EVENTS:     Vital Signs Last 24 Hrs  T(C): 36.8 (13 May 2018 07:50), Max: 36.8 (12 May 2018 15:51)  T(F): 98.2 (13 May 2018 07:50), Max: 98.3 (12 May 2018 15:51)  HR: 63 (13 May 2018 07:50) (63 - 88)  BP: 127/70 (13 May 2018 07:50) (120/67 - 136/61)  BP(mean): --  RR: 18 (13 May 2018 07:50) (18 - 18)  SpO2: 99% (13 May 2018 07:50) (94% - 99%)    PHYSICAL EXAM:    General: No Acute Distress     Neurological: Awake, alert oriented to person, place and time, Following Commands, PERRL, L facial weakness, RUE antigravity, LUE minimal, BLE plegia, Sensation to Light Touch Intact    Pulmonary: Unlabored, clear to Auscultation, No Rales, No Rhonchi, No Wheezes     Cardiovascular: No chest pain. S1, S2, Regular Rate and Rhythm     Gastrointestinal: Soft, Nontender, Nondistended     Incision: clean and dry    LABS:                        12.6   14.0  )-----------( 272      ( 12 May 2018 11:04 )             38.7    05-12    141  |  102  |  11  ----------------------------<  155<H>  3.7   |  27  |  0.44<L>    Ca    9.1      12 May 2018 11:04          05-12 @ 07:01  -  05-13 @ 07:00  --------------------------------------------------------  IN: 600 mL / OUT: 0 mL / NET: 600 mL      MEDICATIONS:  Antibiotics:    Neuro:  acetaminophen   Tablet 650 milliGRAM(s) Oral every 6 hours PRN For Temp greater than 38 C (100.4 F)  acetaminophen   Tablet. 650 milliGRAM(s) Oral every 6 hours PRN Mild Pain (1 - 3)  levETIRAcetam 1000 milliGRAM(s) Oral two times a day  ondansetron Injectable 4 milliGRAM(s) IV Push every 6 hours PRN Nausea and/or Vomiting  oxyCODONE    IR 5 milliGRAM(s) Oral every 4 hours PRN Moderate Pain (4 - 6)  oxyCODONE    IR 10 milliGRAM(s) Oral every 4 hours PRN Severe Pain (7 - 10)  sertraline 100 milliGRAM(s) Oral daily    Cardiac:  metoprolol tartrate 25 milliGRAM(s) Oral two times a day    Pulm:    GI/:  docusate sodium 100 milliGRAM(s) Oral three times a day  senna 2 Tablet(s) Oral at bedtime    Other:   cholecalciferol 1000 Unit(s) Oral daily  dexamethasone     Tablet 4 milliGRAM(s) Oral every 6 hours  enoxaparin Injectable 40 milliGRAM(s) SubCutaneous at bedtime    DIET: [] Regular [] CCD [] Renal [] Puree [] Dysphagia [] Tube Feeds:     IMAGING:

## 2018-05-13 NOTE — PROVIDER CONTACT NOTE (OTHER) - ASSESSMENT
Pt observed for less than 1 minute with lip smacking/twitching, difficulty w/speech. AOx4, 128/71, HR 78 95%, RA

## 2018-05-13 NOTE — PROVIDER CONTACT NOTE (OTHER) - ACTION/TREATMENT ORDERED:
Provider made aware. Provider stated will increase Keppra to 1250 mg PO
Continue to monitor for signs/symptoms of infection. No further intervention needed at this time.
PA at bedside. No further intervention at this time.
okay to remove ivanna now. will DTV

## 2018-05-14 DIAGNOSIS — R73.9 HYPERGLYCEMIA, UNSPECIFIED: ICD-10-CM

## 2018-05-14 LAB
ANION GAP SERPL CALC-SCNC: 14 MMOL/L — SIGNIFICANT CHANGE UP (ref 5–17)
BUN SERPL-MCNC: 18 MG/DL — SIGNIFICANT CHANGE UP (ref 7–23)
CALCIUM SERPL-MCNC: 9.3 MG/DL — SIGNIFICANT CHANGE UP (ref 8.4–10.5)
CHLORIDE SERPL-SCNC: 98 MMOL/L — SIGNIFICANT CHANGE UP (ref 96–108)
CO2 SERPL-SCNC: 23 MMOL/L — SIGNIFICANT CHANGE UP (ref 22–31)
CREAT SERPL-MCNC: 0.45 MG/DL — LOW (ref 0.5–1.3)
GLUCOSE BLDC GLUCOMTR-MCNC: 111 MG/DL — HIGH (ref 70–99)
GLUCOSE BLDC GLUCOMTR-MCNC: 124 MG/DL — HIGH (ref 70–99)
GLUCOSE SERPL-MCNC: 191 MG/DL — HIGH (ref 70–99)
HCT VFR BLD CALC: 38 % — SIGNIFICANT CHANGE UP (ref 34.5–45)
HGB BLD-MCNC: 11.7 G/DL — SIGNIFICANT CHANGE UP (ref 11.5–15.5)
MCHC RBC-ENTMCNC: 29.1 PG — SIGNIFICANT CHANGE UP (ref 27–34)
MCHC RBC-ENTMCNC: 30.8 GM/DL — LOW (ref 32–36)
MCV RBC AUTO: 94.5 FL — SIGNIFICANT CHANGE UP (ref 80–100)
PLATELET # BLD AUTO: 301 K/UL — SIGNIFICANT CHANGE UP (ref 150–400)
POTASSIUM SERPL-MCNC: 4 MMOL/L — SIGNIFICANT CHANGE UP (ref 3.5–5.3)
POTASSIUM SERPL-SCNC: 4 MMOL/L — SIGNIFICANT CHANGE UP (ref 3.5–5.3)
RBC # BLD: 4.02 M/UL — SIGNIFICANT CHANGE UP (ref 3.8–5.2)
RBC # FLD: 13 % — SIGNIFICANT CHANGE UP (ref 10.3–14.5)
SODIUM SERPL-SCNC: 135 MMOL/L — SIGNIFICANT CHANGE UP (ref 135–145)
SURGICAL PATHOLOGY STUDY: SIGNIFICANT CHANGE UP
WBC # BLD: 11.43 K/UL — HIGH (ref 3.8–10.5)
WBC # FLD AUTO: 11.43 K/UL — HIGH (ref 3.8–10.5)

## 2018-05-14 PROCEDURE — 99233 SBSQ HOSP IP/OBS HIGH 50: CPT

## 2018-05-14 PROCEDURE — 99222 1ST HOSP IP/OBS MODERATE 55: CPT

## 2018-05-14 RX ORDER — SODIUM CHLORIDE 9 MG/ML
1000 INJECTION, SOLUTION INTRAVENOUS
Qty: 0 | Refills: 0 | Status: DISCONTINUED | OUTPATIENT
Start: 2018-05-14 | End: 2018-05-15

## 2018-05-14 RX ORDER — DEXTROSE 50 % IN WATER 50 %
12.5 SYRINGE (ML) INTRAVENOUS ONCE
Qty: 0 | Refills: 0 | Status: DISCONTINUED | OUTPATIENT
Start: 2018-05-14 | End: 2018-05-15

## 2018-05-14 RX ORDER — GLUCAGON INJECTION, SOLUTION 0.5 MG/.1ML
1 INJECTION, SOLUTION SUBCUTANEOUS ONCE
Qty: 0 | Refills: 0 | Status: DISCONTINUED | OUTPATIENT
Start: 2018-05-14 | End: 2018-05-15

## 2018-05-14 RX ORDER — DEXAMETHASONE 0.5 MG/5ML
4 ELIXIR ORAL EVERY 8 HOURS
Qty: 0 | Refills: 0 | Status: DISCONTINUED | OUTPATIENT
Start: 2018-05-14 | End: 2018-05-15

## 2018-05-14 RX ORDER — SODIUM CHLORIDE 9 MG/ML
1 INJECTION INTRAMUSCULAR; INTRAVENOUS; SUBCUTANEOUS THREE TIMES A DAY
Qty: 0 | Refills: 0 | Status: DISCONTINUED | OUTPATIENT
Start: 2018-05-14 | End: 2018-05-15

## 2018-05-14 RX ORDER — INSULIN LISPRO 100/ML
VIAL (ML) SUBCUTANEOUS
Qty: 0 | Refills: 0 | Status: DISCONTINUED | OUTPATIENT
Start: 2018-05-14 | End: 2018-05-15

## 2018-05-14 RX ORDER — DEXTROSE 50 % IN WATER 50 %
25 SYRINGE (ML) INTRAVENOUS ONCE
Qty: 0 | Refills: 0 | Status: DISCONTINUED | OUTPATIENT
Start: 2018-05-14 | End: 2018-05-15

## 2018-05-14 RX ORDER — DEXTROSE 50 % IN WATER 50 %
15 SYRINGE (ML) INTRAVENOUS ONCE
Qty: 0 | Refills: 0 | Status: DISCONTINUED | OUTPATIENT
Start: 2018-05-14 | End: 2018-05-15

## 2018-05-14 RX ORDER — INSULIN LISPRO 100/ML
VIAL (ML) SUBCUTANEOUS AT BEDTIME
Qty: 0 | Refills: 0 | Status: DISCONTINUED | OUTPATIENT
Start: 2018-05-14 | End: 2018-05-15

## 2018-05-14 RX ADMIN — Medication 25 MILLIGRAM(S): at 05:02

## 2018-05-14 RX ADMIN — ENOXAPARIN SODIUM 40 MILLIGRAM(S): 100 INJECTION SUBCUTANEOUS at 21:32

## 2018-05-14 RX ADMIN — SODIUM CHLORIDE 1 GRAM(S): 9 INJECTION INTRAMUSCULAR; INTRAVENOUS; SUBCUTANEOUS at 21:32

## 2018-05-14 RX ADMIN — LEVETIRACETAM 1250 MILLIGRAM(S): 250 TABLET, FILM COATED ORAL at 05:02

## 2018-05-14 RX ADMIN — Medication 1000 UNIT(S): at 12:06

## 2018-05-14 RX ADMIN — Medication 4 MILLIGRAM(S): at 17:48

## 2018-05-14 RX ADMIN — SERTRALINE 100 MILLIGRAM(S): 25 TABLET, FILM COATED ORAL at 12:06

## 2018-05-14 RX ADMIN — SODIUM CHLORIDE 1 GRAM(S): 9 INJECTION INTRAMUSCULAR; INTRAVENOUS; SUBCUTANEOUS at 17:48

## 2018-05-14 RX ADMIN — Medication 100 MILLIGRAM(S): at 05:02

## 2018-05-14 RX ADMIN — LEVETIRACETAM 1250 MILLIGRAM(S): 250 TABLET, FILM COATED ORAL at 17:48

## 2018-05-14 RX ADMIN — Medication 4 MILLIGRAM(S): at 00:15

## 2018-05-14 RX ADMIN — Medication 4 MILLIGRAM(S): at 05:02

## 2018-05-14 RX ADMIN — Medication 650 MILLIGRAM(S): at 12:38

## 2018-05-14 RX ADMIN — Medication 4 MILLIGRAM(S): at 21:32

## 2018-05-14 RX ADMIN — Medication 650 MILLIGRAM(S): at 12:06

## 2018-05-14 RX ADMIN — Medication 4 MILLIGRAM(S): at 12:06

## 2018-05-14 RX ADMIN — Medication 25 MILLIGRAM(S): at 17:48

## 2018-05-14 NOTE — CONSULT NOTE ADULT - SUBJECTIVE AND OBJECTIVE BOX
Cc: Worsening function    HPI:  67yr old female with PMH notable for chronic progressive multiple sclerosis, HTN, depression and new onset of focal left UE motor seizures from 1/18'. Abnormality seen on recent MRI brain (large multinodular enhancing lesion) from 4/23/18. Pt wheelchair bound since 1987. Her baseline 6 months ago was paraplegic, incontinent, with little function of her right UE. Since her seizure in April, she has had reduced function of left hand significantly affecting any independence. Went to OR and had right frontal craniotomy and biopsy- frozen section with glioma.    REVIEW OF SYSTEMS: No chest pain, shortness of breath, nausea, vomiting or diarhea.      PAST MEDICAL & SURGICAL HISTORY  Neoplasm of unspecified behavior of brain  Seizures  Wheelchair bound  Multiple sclerosis  Hypertension  History of tonsillectomy    FUNCTIONAL HISTORY:   Lives with spouse in apartment  Requires assistance w ADLs and transfers, mobility with wheelchair.     FAMILY HISTORY   Family history of rheumatic fever (Father)  Family history of hypertension (Mother)      RECENT LABS/IMAGING  CBC Full  -  ( 14 May 2018 07:14 )  WBC Count : 11.43 K/uL  Hemoglobin : 11.7 g/dL  Hematocrit : 38.0 %  Platelet Count - Automated : 301 K/uL  Mean Cell Volume : 94.5 fl  Mean Cell Hemoglobin : 29.1 pg  Mean Cell Hemoglobin Concentration : 30.8 gm/dL  Auto Neutrophil # : x  Auto Lymphocyte # : x  Auto Monocyte # : x  Auto Eosinophil # : x  Auto Basophil # : x  Auto Neutrophil % : x  Auto Lymphocyte % : x  Auto Monocyte % : x  Auto Eosinophil % : x  Auto Basophil % : x    05-14    135  |  98  |  18  ----------------------------<  191<H>  4.0   |  23  |  0.45<L>    Ca    9.3      14 May 2018 06:04          VITALS  T(C): 36.7 (05-14-18 @ 11:50), Max: 36.8 (05-14-18 @ 04:25)  HR: 73 (05-14-18 @ 11:50) (62 - 76)  BP: 117/69 (05-14-18 @ 11:50) (102/46 - 126/74)  RR: 18 (05-14-18 @ 11:50) (16 - 18)  SpO2: 98% (05-14-18 @ 11:50) (96% - 98%)  Wt(kg): --    ALLERGIES  latex (Urticaria)  shellfish (Hives; Swelling)  sulfa drugs (Fever; Rash)      MEDICATIONS   acetaminophen   Tablet 650 milliGRAM(s) Oral every 6 hours PRN  acetaminophen   Tablet. 650 milliGRAM(s) Oral every 6 hours PRN  cholecalciferol 1000 Unit(s) Oral daily  dexamethasone     Tablet 4 milliGRAM(s) Oral every 6 hours  dextrose 40% Gel 15 Gram(s) Oral once PRN  dextrose 5%. 1000 milliLiter(s) IV Continuous <Continuous>  dextrose 50% Injectable 12.5 Gram(s) IV Push once  dextrose 50% Injectable 25 Gram(s) IV Push once  dextrose 50% Injectable 25 Gram(s) IV Push once  docusate sodium 100 milliGRAM(s) Oral three times a day  enoxaparin Injectable 40 milliGRAM(s) SubCutaneous at bedtime  glucagon  Injectable 1 milliGRAM(s) IntraMuscular once PRN  insulin lispro (HumaLOG) corrective regimen sliding scale   SubCutaneous three times a day before meals  insulin lispro (HumaLOG) corrective regimen sliding scale   SubCutaneous at bedtime  levETIRAcetam 1250 milliGRAM(s) Oral two times a day  metoprolol tartrate 25 milliGRAM(s) Oral two times a day  ondansetron Injectable 4 milliGRAM(s) IV Push every 6 hours PRN  oxyCODONE    IR 5 milliGRAM(s) Oral every 4 hours PRN  oxyCODONE    IR 10 milliGRAM(s) Oral every 4 hours PRN  senna 2 Tablet(s) Oral at bedtime  sertraline 100 milliGRAM(s) Oral daily      ----------------------------------------------------------------------------------------  PHYSICAL EXAM  Constitutional - NAD, Comfortable  HEENT - NCAT, EOMI  Neck - Supple, No limited ROM  Chest - CTA bilaterally, No wheeze, No rhonchi, No crackles  Cardiovascular - RRR, S1S2, No murmurs  Abdomen - BS+, Soft, NTND  Extremities - No C/C/E, No calf tenderness   Neurologic Exam -                    Cognitive - Awake, Alert, AAO to self, place, date, year, situation     Communication - Fluent, No dysarthria, no aphasia     Cranial Nerves - CN 2-12 intact     Motor - No focal deficits                       Sensory - Intact to LT     Reflexes - DTR Intact, No primitive reflexive     Balance - WNL Static  Psychiatric - Mood stable, Affect WNL      Impression:    68 yo with M.S. now S/P bx of brain lesion- with glioma on frozen section      Plan:  PT- ROM, Bed Mob, Transfers, Amb w AD and bracing as needed  OT- ADLs, bracing  Prec- Falls, Cardiac  DVT Prophylaxis- Lovenox  Skin- Turn q2 h  Dispo- Cc: Worsening function    HPI:  67yr old female with PMH notable for chronic progressive multiple sclerosis, HTN, depression and new onset of focal left UE motor seizures from 1/18'. Abnormality seen on recent MRI brain (large multinodular enhancing lesion) from 4/23/18. Pt wheelchair bound since 1987. Her baseline 6 months ago was paraplegic, incontinent, with little function of her right UE. Since her seizure in April, she has had reduced function of left hand significantly affecting any independence. Went to OR and had right frontal craniotomy and biopsy- frozen section with glioma.    REVIEW OF SYSTEMS: Diffuse weakness- only w movement of RUE- LUE with weakness over the past 4 months, + RUE spasticity, No chest pain, shortness of breath, nausea, vomiting or diarhea.      PAST MEDICAL & SURGICAL HISTORY  Neoplasm of unspecified behavior of brain  Seizures  Wheelchair bound  Multiple sclerosis  Hypertension  History of tonsillectomy    FUNCTIONAL HISTORY:   Lives with spouse in apartment  Requires assistance w ADLs and transfers, mobility with wheelchair.     FAMILY HISTORY   Family history of rheumatic fever (Father)  Family history of hypertension (Mother)      RECENT LABS/IMAGING  CBC Full  -  ( 14 May 2018 07:14 )  WBC Count : 11.43 K/uL  Hemoglobin : 11.7 g/dL  Hematocrit : 38.0 %  Platelet Count - Automated : 301 K/uL  Mean Cell Volume : 94.5 fl  Mean Cell Hemoglobin : 29.1 pg  Mean Cell Hemoglobin Concentration : 30.8 gm/dL  Auto Neutrophil # : x  Auto Lymphocyte # : x  Auto Monocyte # : x  Auto Eosinophil # : x  Auto Basophil # : x  Auto Neutrophil % : x  Auto Lymphocyte % : x  Auto Monocyte % : x  Auto Eosinophil % : x  Auto Basophil % : x    05-14    135  |  98  |  18  ----------------------------<  191<H>  4.0   |  23  |  0.45<L>    Ca    9.3      14 May 2018 06:04          VITALS  T(C): 36.7 (05-14-18 @ 11:50), Max: 36.8 (05-14-18 @ 04:25)  HR: 73 (05-14-18 @ 11:50) (62 - 76)  BP: 117/69 (05-14-18 @ 11:50) (102/46 - 126/74)  RR: 18 (05-14-18 @ 11:50) (16 - 18)  SpO2: 98% (05-14-18 @ 11:50) (96% - 98%)  Wt(kg): --    ALLERGIES  latex (Urticaria)  shellfish (Hives; Swelling)  sulfa drugs (Fever; Rash)      MEDICATIONS   acetaminophen   Tablet 650 milliGRAM(s) Oral every 6 hours PRN  acetaminophen   Tablet. 650 milliGRAM(s) Oral every 6 hours PRN  cholecalciferol 1000 Unit(s) Oral daily  dexamethasone     Tablet 4 milliGRAM(s) Oral every 6 hours  dextrose 40% Gel 15 Gram(s) Oral once PRN  dextrose 5%. 1000 milliLiter(s) IV Continuous <Continuous>  dextrose 50% Injectable 12.5 Gram(s) IV Push once  dextrose 50% Injectable 25 Gram(s) IV Push once  dextrose 50% Injectable 25 Gram(s) IV Push once  docusate sodium 100 milliGRAM(s) Oral three times a day  enoxaparin Injectable 40 milliGRAM(s) SubCutaneous at bedtime  glucagon  Injectable 1 milliGRAM(s) IntraMuscular once PRN  insulin lispro (HumaLOG) corrective regimen sliding scale   SubCutaneous three times a day before meals  insulin lispro (HumaLOG) corrective regimen sliding scale   SubCutaneous at bedtime  levETIRAcetam 1250 milliGRAM(s) Oral two times a day  metoprolol tartrate 25 milliGRAM(s) Oral two times a day  ondansetron Injectable 4 milliGRAM(s) IV Push every 6 hours PRN  oxyCODONE    IR 5 milliGRAM(s) Oral every 4 hours PRN  oxyCODONE    IR 10 milliGRAM(s) Oral every 4 hours PRN  senna 2 Tablet(s) Oral at bedtime  sertraline 100 milliGRAM(s) Oral daily      ----------------------------------------------------------------------------------------  PHYSICAL EXAM  Constitutional - NAD, Comfortable  HEENT - NCAT, EOMI  Neck - Supple, No limited ROM  Chest - CTA bilaterally, No wheeze, No rhonchi, No crackles  Cardiovascular - RRR, S1S2, No murmurs  Abdomen - BS+, Soft, NTND  Extremities - No C/C/E, No calf tenderness bl LEs with ankle flexion contracture    Neurologic Exam -          AAO x 3  Speech appropriate  RUE with migdalia 2 spasticity at elbow flexors and wrist flexors  Fair grade motor RUE, 0/5 LUE and bl LEs              Psychiatric - Mood stable, Affect WNL      Impression:    66 yo with M.S. now S/P bx of brain lesion- with glioma on frozen section. Patient with spastic triplegia.    Plan:  PT- ROM, Bed Mob, Transfers, Amb w AD and bracing as needed  OT- ADLs, bracing  Prec- Falls, Cardiac  DVT Prophylaxis- Lovenox  Skin- Turn q2 h  Dispo- GEORGE  Discussed with patient and her daughter.

## 2018-05-14 NOTE — PROGRESS NOTE ADULT - ASSESSMENT
HPI:  67yr old female presents to [facility] for a Stereotactic Biopsy for Tumor PC on 5/10/18. 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 Abnormality seen on recent MRI brain (large multinodular enhancing lesion) from 4/23/18. Pt wheelchair bound since 1987. Her baseline 6 months ago was paraplegic, incontinent, with little function of her right UE. Since her seizure in April, she has had reduced function of left hand significantly affecting any independence. Denies chest pain or SOB. (07 May 2018 10:32)    s/p right craniotomy and biopsy of brain tumor- 5/10

## 2018-05-14 NOTE — PROGRESS NOTE ADULT - SUBJECTIVE AND OBJECTIVE BOX
Rodrigo Pardorahulneri   Pager [phone]  Office [phone]      CC: Patient is a 67y old  Female who presents with a chief complaint of "Here for a biopsy of my head" (10 May 2018 06:32)      SUBJECTIVE / OVERNIGHT EVENTS:     MEDICATIONS  (STANDING):  cholecalciferol 1000 Unit(s) Oral daily  dexamethasone     Tablet 4 milliGRAM(s) Oral every 6 hours  docusate sodium 100 milliGRAM(s) Oral three times a day  enoxaparin Injectable 40 milliGRAM(s) SubCutaneous at bedtime  levETIRAcetam 1250 milliGRAM(s) Oral two times a day  metoprolol tartrate 25 milliGRAM(s) Oral two times a day  senna 2 Tablet(s) Oral at bedtime  sertraline 100 milliGRAM(s) Oral daily    MEDICATIONS  (PRN):  acetaminophen   Tablet 650 milliGRAM(s) Oral every 6 hours PRN For Temp greater than 38 C (100.4 F)  acetaminophen   Tablet. 650 milliGRAM(s) Oral every 6 hours PRN Mild Pain (1 - 3)  ondansetron Injectable 4 milliGRAM(s) IV Push every 6 hours PRN Nausea and/or Vomiting  oxyCODONE    IR 5 milliGRAM(s) Oral every 4 hours PRN Moderate Pain (4 - 6)  oxyCODONE    IR 10 milliGRAM(s) Oral every 4 hours PRN Severe Pain (7 - 10)      Vital Signs Last 24 Hrs  T(C): 36.8 (14 May 2018 07:38), Max: 36.8 (14 May 2018 04:25)  T(F): 98.2 (14 May 2018 07:38), Max: 98.3 (14 May 2018 04:25)  HR: 66 (14 May 2018 07:38) (62 - 76)  BP: 126/74 (14 May 2018 07:38) (102/46 - 126/74)  BP(mean): --  RR: 16 (14 May 2018 07:38) (16 - 18)  SpO2: 98% (14 May 2018 07:38) (96% - 98%)  CAPILLARY BLOOD GLUCOSE        I&O's Summary    13 May 2018 07:01  -  14 May 2018 07:00  --------------------------------------------------------  IN: 600 mL / OUT: 0 mL / NET: 600 mL      tele:    PHYSICAL EXAM:    GENERAL: NAD   HEENT: EOMI, PERRL  PULM: Clear to auscultation bilaterally  CV: Regular rate and rhythm; nl S1, S2; No murmurs, rubs, or gallops  ABDOMEN: Soft, Nontender, Nondistended; Bowel sounds present  EXTREMITIES/MSK:  No edema, calf tenderness   PSYCH: AAOx3  NEUROLOGY: non-focal          LABS:                        11.7   11.43 )-----------( 301      ( 14 May 2018 07:14 )             38.0     05-14    135  |  98  |  18  ----------------------------<  191<H>  4.0   |  23  |  0.45<L>    Ca    9.3      14 May 2018 06:04                  RADIOLOGY & ADDITIONAL TESTS:    Imaging Personally Reviewed:    Consultant(s) Notes Reviewed:      Care Discussed with Consultants/Other Providers: Rodrigo Blood   Pager [phone]  Office [phone]      CC: Patient is a 67y old  Female who presents with a chief complaint of "Here for a biopsy of my head" (10 May 2018 06:32)      SUBJECTIVE / OVERNIGHT EVENTS: No seizures or hallucinations. +BM    MEDICATIONS  (STANDING):  cholecalciferol 1000 Unit(s) Oral daily  dexamethasone     Tablet 4 milliGRAM(s) Oral every 6 hours  docusate sodium 100 milliGRAM(s) Oral three times a day  enoxaparin Injectable 40 milliGRAM(s) SubCutaneous at bedtime  levETIRAcetam 1250 milliGRAM(s) Oral two times a day  metoprolol tartrate 25 milliGRAM(s) Oral two times a day  senna 2 Tablet(s) Oral at bedtime  sertraline 100 milliGRAM(s) Oral daily    MEDICATIONS  (PRN):  acetaminophen   Tablet 650 milliGRAM(s) Oral every 6 hours PRN For Temp greater than 38 C (100.4 F)  acetaminophen   Tablet. 650 milliGRAM(s) Oral every 6 hours PRN Mild Pain (1 - 3)  ondansetron Injectable 4 milliGRAM(s) IV Push every 6 hours PRN Nausea and/or Vomiting  oxyCODONE    IR 5 milliGRAM(s) Oral every 4 hours PRN Moderate Pain (4 - 6)  oxyCODONE    IR 10 milliGRAM(s) Oral every 4 hours PRN Severe Pain (7 - 10)      Vital Signs Last 24 Hrs  T(C): 36.8 (14 May 2018 07:38), Max: 36.8 (14 May 2018 04:25)  T(F): 98.2 (14 May 2018 07:38), Max: 98.3 (14 May 2018 04:25)  HR: 66 (14 May 2018 07:38) (62 - 76)  BP: 126/74 (14 May 2018 07:38) (102/46 - 126/74)  BP(mean): --  RR: 16 (14 May 2018 07:38) (16 - 18)  SpO2: 98% (14 May 2018 07:38) (96% - 98%)  CAPILLARY BLOOD GLUCOSE        I&O's Summary    13 May 2018 07:01  -  14 May 2018 07:00  --------------------------------------------------------  IN: 600 mL / OUT: 0 mL / NET: 600 mL      tele:    PHYSICAL EXAM:    GENERAL: NAD   HEENT: EOMI, PERRL  PULM: Clear to auscultation bilaterally  CV: Regular rate and rhythm; nl S1, S2; +murmur 2/5   ABDOMEN: Soft, Nontender, Nondistended; Bowel sounds present  EXTREMITIES/MSK:  No edema, calf tenderness   PSYCH: AAOx3  NEUROLOGY: paraplegic; RUE 4/5 c contraction; LUE 0-1/5          LABS:                        11.7   11.43 )-----------( 301      ( 14 May 2018 07:14 )             38.0     05-14    135  |  98  |  18  ----------------------------<  191<H>  4.0   |  23  |  0.45<L>    Ca    9.3      14 May 2018 06:04                  RADIOLOGY & ADDITIONAL TESTS:    Imaging Personally Reviewed:    Consultant(s) Notes Reviewed:      Care Discussed with Consultants/Other Providers: Rodrigo Blood   Pager [phone]  Office [phone]      CC: Patient is a 67y old  Female who presents with a chief complaint of "Here for a biopsy of my head" (10 May 2018 06:32)      SUBJECTIVE / OVERNIGHT EVENTS: No seizures or hallucinations. +BM    MEDICATIONS  (STANDING):  cholecalciferol 1000 Unit(s) Oral daily  dexamethasone     Tablet 4 milliGRAM(s) Oral every 6 hours  docusate sodium 100 milliGRAM(s) Oral three times a day  enoxaparin Injectable 40 milliGRAM(s) SubCutaneous at bedtime  levETIRAcetam 1250 milliGRAM(s) Oral two times a day  metoprolol tartrate 25 milliGRAM(s) Oral two times a day  senna 2 Tablet(s) Oral at bedtime  sertraline 100 milliGRAM(s) Oral daily    MEDICATIONS  (PRN):  acetaminophen   Tablet 650 milliGRAM(s) Oral every 6 hours PRN For Temp greater than 38 C (100.4 F)  acetaminophen   Tablet. 650 milliGRAM(s) Oral every 6 hours PRN Mild Pain (1 - 3)  ondansetron Injectable 4 milliGRAM(s) IV Push every 6 hours PRN Nausea and/or Vomiting  oxyCODONE    IR 5 milliGRAM(s) Oral every 4 hours PRN Moderate Pain (4 - 6)  oxyCODONE    IR 10 milliGRAM(s) Oral every 4 hours PRN Severe Pain (7 - 10)      Vital Signs Last 24 Hrs  T(C): 36.8 (14 May 2018 07:38), Max: 36.8 (14 May 2018 04:25)  T(F): 98.2 (14 May 2018 07:38), Max: 98.3 (14 May 2018 04:25)  HR: 66 (14 May 2018 07:38) (62 - 76)  BP: 126/74 (14 May 2018 07:38) (102/46 - 126/74)  BP(mean): --  RR: 16 (14 May 2018 07:38) (16 - 18)  SpO2: 98% (14 May 2018 07:38) (96% - 98%)  CAPILLARY BLOOD GLUCOSE        I&O's Summary    13 May 2018 07:01  -  14 May 2018 07:00  --------------------------------------------------------  IN: 600 mL / OUT: 0 mL / NET: 600 mL          PHYSICAL EXAM:    GENERAL: NAD   HEENT: EOMI, PERRL  PULM: Clear to auscultation bilaterally  CV: Regular rate and rhythm; nl S1, S2; +murmur 2/5   ABDOMEN: Soft, Nontender, Nondistended; Bowel sounds present  EXTREMITIES/MSK:  No edema, calf tenderness   PSYCH: AAOx3  NEUROLOGY: paraplegic; RUE 4/5 c contraction; LUE 0-1/5          LABS:                        11.7   11.43 )-----------( 301      ( 14 May 2018 07:14 )             38.0     05-14    135  |  98  |  18  ----------------------------<  191<H>  4.0   |  23  |  0.45<L>    Ca    9.3      14 May 2018 06:04                  RADIOLOGY & ADDITIONAL TESTS:    Imaging Personally Reviewed:    Consultant(s) Notes Reviewed:      Care Discussed with Consultants/Other Providers:

## 2018-05-14 NOTE — PROGRESS NOTE ADULT - SUBJECTIVE AND OBJECTIVE BOX
SUBJECTIVE: Patient was seen and evaluated at bedside. Patient is resting comfortably in bed and is in no new acute distress.     OVERNIGHT EVENTS: none     Vital Signs Last 24 Hrs  T(C): 36.7 (14 May 2018 11:50), Max: 36.8 (14 May 2018 04:25)  T(F): 98 (14 May 2018 11:50), Max: 98.3 (14 May 2018 04:25)  HR: 73 (14 May 2018 11:50) (62 - 76)  BP: 117/69 (14 May 2018 11:50) (102/46 - 126/74)  BP(mean): --  RR: 18 (14 May 2018 11:50) (16 - 18)  SpO2: 98% (14 May 2018 11:50) (96% - 98%)    PHYSICAL EXAM:    General: No Acute Distress     Neurological: Awake, alert oriented to person, place and time, left facial , right upper extremity antigravity, lue wds and b/l lower plegic.   Pulmonary: Clear to Auscultation, No Rales, No Rhonchi, No Wheezes     Cardiovascular: S1, S2, Regular Rate and Rhythm     Gastrointestinal: Soft, Nontender, Nondistended     Incision: clean and dry     LABS:                        11.7   11.43 )-----------( 301      ( 14 May 2018 07:14 )             38.0    05-14    135  |  98  |  18  ----------------------------<  191<H>  4.0   |  23  |  0.45<L>    Ca    9.3      14 May 2018 06:04          05-13 @ 07:01 - 05-14 @ 07:00  --------------------------------------------------------  IN: 600 mL / OUT: 0 mL / NET: 600 mL    05-14 @ 07:01 - 05-14 @ 13:33  --------------------------------------------------------  IN: 360 mL / OUT: 0 mL / NET: 360 mL      DRAINS:     MEDICATIONS:  Antibiotics:    Neuro:  acetaminophen   Tablet 650 milliGRAM(s) Oral every 6 hours PRN For Temp greater than 38 C (100.4 F)  acetaminophen   Tablet. 650 milliGRAM(s) Oral every 6 hours PRN Mild Pain (1 - 3)  levETIRAcetam 1250 milliGRAM(s) Oral two times a day  ondansetron Injectable 4 milliGRAM(s) IV Push every 6 hours PRN Nausea and/or Vomiting  oxyCODONE    IR 5 milliGRAM(s) Oral every 4 hours PRN Moderate Pain (4 - 6)  oxyCODONE    IR 10 milliGRAM(s) Oral every 4 hours PRN Severe Pain (7 - 10)  sertraline 100 milliGRAM(s) Oral daily    Cardiac:  metoprolol tartrate 25 milliGRAM(s) Oral two times a day    Pulm:    GI/:  docusate sodium 100 milliGRAM(s) Oral three times a day  senna 2 Tablet(s) Oral at bedtime    Other:   cholecalciferol 1000 Unit(s) Oral daily  dexamethasone     Tablet 4 milliGRAM(s) Oral every 8 hours  dextrose 40% Gel 15 Gram(s) Oral once PRN Blood Glucose LESS THAN 70 milliGRAM(s)/deciliter  dextrose 5%. 1000 milliLiter(s) IV Continuous <Continuous>  dextrose 50% Injectable 12.5 Gram(s) IV Push once  dextrose 50% Injectable 25 Gram(s) IV Push once  dextrose 50% Injectable 25 Gram(s) IV Push once  enoxaparin Injectable 40 milliGRAM(s) SubCutaneous at bedtime  glucagon  Injectable 1 milliGRAM(s) IntraMuscular once PRN Glucose LESS THAN 70 milligrams/deciliter  insulin lispro (HumaLOG) corrective regimen sliding scale   SubCutaneous three times a day before meals  insulin lispro (HumaLOG) corrective regimen sliding scale   SubCutaneous at bedtime  sodium chloride 1 Gram(s) Oral three times a day    DIET: [] Regular [] CCD [] Renal [] Puree [] Dysphagia [] Tube Feeds: regular     IMAGING: no new imaging

## 2018-05-14 NOTE — PROGRESS NOTE ADULT - PROBLEM SELECTOR PLAN 1
s/p biopsy   1 continue keppra 1250 mg BID   2 decadron to 4q8 today   3 pt/ot and pm&r to see   4 prn pain meds   5 dvt ppx sql scds   6 continue metoprolol   7 regular diet   8 stool softeners   9 dispo :p   likely rehab

## 2018-05-15 ENCOUNTER — TRANSCRIPTION ENCOUNTER (OUTPATIENT)
Age: 68
End: 2018-05-15

## 2018-05-15 VITALS
RESPIRATION RATE: 18 BRPM | HEART RATE: 71 BPM | DIASTOLIC BLOOD PRESSURE: 71 MMHG | OXYGEN SATURATION: 96 % | TEMPERATURE: 98 F | SYSTOLIC BLOOD PRESSURE: 126 MMHG

## 2018-05-15 LAB
ANION GAP SERPL CALC-SCNC: 12 MMOL/L — SIGNIFICANT CHANGE UP (ref 5–17)
BUN SERPL-MCNC: 24 MG/DL — HIGH (ref 7–23)
CALCIUM SERPL-MCNC: 8.8 MG/DL — SIGNIFICANT CHANGE UP (ref 8.4–10.5)
CHLORIDE SERPL-SCNC: 100 MMOL/L — SIGNIFICANT CHANGE UP (ref 96–108)
CO2 SERPL-SCNC: 26 MMOL/L — SIGNIFICANT CHANGE UP (ref 22–31)
CREAT SERPL-MCNC: 0.52 MG/DL — SIGNIFICANT CHANGE UP (ref 0.5–1.3)
GLUCOSE BLDC GLUCOMTR-MCNC: 122 MG/DL — HIGH (ref 70–99)
GLUCOSE BLDC GLUCOMTR-MCNC: 98 MG/DL — SIGNIFICANT CHANGE UP (ref 70–99)
GLUCOSE SERPL-MCNC: 117 MG/DL — HIGH (ref 70–99)
HBA1C BLD-MCNC: 5 % — SIGNIFICANT CHANGE UP (ref 4–5.6)
POTASSIUM SERPL-MCNC: 4.3 MMOL/L — SIGNIFICANT CHANGE UP (ref 3.5–5.3)
POTASSIUM SERPL-SCNC: 4.3 MMOL/L — SIGNIFICANT CHANGE UP (ref 3.5–5.3)
SODIUM SERPL-SCNC: 138 MMOL/L — SIGNIFICANT CHANGE UP (ref 135–145)

## 2018-05-15 PROCEDURE — 99233 SBSQ HOSP IP/OBS HIGH 50: CPT

## 2018-05-15 RX ORDER — DOCUSATE SODIUM 100 MG
1 CAPSULE ORAL
Qty: 0 | Refills: 0 | COMMUNITY
Start: 2018-05-15

## 2018-05-15 RX ORDER — OXYCODONE HYDROCHLORIDE 5 MG/1
1 TABLET ORAL
Qty: 0 | Refills: 0 | COMMUNITY
Start: 2018-05-15

## 2018-05-15 RX ORDER — LACOSAMIDE 50 MG/1
1 TABLET ORAL
Qty: 0 | Refills: 0 | COMMUNITY
Start: 2018-05-15

## 2018-05-15 RX ORDER — CHOLECALCIFEROL (VITAMIN D3) 125 MCG
1000 CAPSULE ORAL
Qty: 0 | Refills: 0 | COMMUNITY
Start: 2018-05-15

## 2018-05-15 RX ORDER — PROPANTHELINE BROMIDE 15 MG
1 TABLET ORAL
Qty: 0 | Refills: 0 | COMMUNITY

## 2018-05-15 RX ORDER — PINDOLOL 10 MG
1 TABLET ORAL
Qty: 0 | Refills: 0 | COMMUNITY

## 2018-05-15 RX ORDER — LACOSAMIDE 50 MG/1
50 TABLET ORAL
Qty: 0 | Refills: 0 | Status: DISCONTINUED | OUTPATIENT
Start: 2018-05-15 | End: 2018-05-15

## 2018-05-15 RX ORDER — CHOLECALCIFEROL (VITAMIN D3) 125 MCG
1 CAPSULE ORAL
Qty: 0 | Refills: 0 | COMMUNITY

## 2018-05-15 RX ORDER — SENNA PLUS 8.6 MG/1
2 TABLET ORAL
Qty: 0 | Refills: 0 | COMMUNITY
Start: 2018-05-15

## 2018-05-15 RX ORDER — OXYCODONE HYDROCHLORIDE 5 MG/1
10 TABLET ORAL EVERY 4 HOURS
Qty: 0 | Refills: 0 | Status: DISCONTINUED | OUTPATIENT
Start: 2018-05-15 | End: 2018-05-15

## 2018-05-15 RX ORDER — LEVETIRACETAM 250 MG/1
5 TABLET, FILM COATED ORAL
Qty: 0 | Refills: 0 | COMMUNITY
Start: 2018-05-15

## 2018-05-15 RX ORDER — LEVETIRACETAM 250 MG/1
1 TABLET, FILM COATED ORAL
Qty: 0 | Refills: 0 | COMMUNITY

## 2018-05-15 RX ORDER — OXYCODONE HYDROCHLORIDE 5 MG/1
5 TABLET ORAL EVERY 4 HOURS
Qty: 0 | Refills: 0 | Status: DISCONTINUED | OUTPATIENT
Start: 2018-05-15 | End: 2018-05-15

## 2018-05-15 RX ORDER — ACETAMINOPHEN 500 MG
2 TABLET ORAL
Qty: 0 | Refills: 0 | COMMUNITY
Start: 2018-05-15

## 2018-05-15 RX ORDER — SERTRALINE 25 MG/1
1 TABLET, FILM COATED ORAL
Qty: 0 | Refills: 0 | COMMUNITY

## 2018-05-15 RX ORDER — DEXAMETHASONE 0.5 MG/5ML
1 ELIXIR ORAL
Qty: 0 | Refills: 0 | COMMUNITY
Start: 2018-05-15

## 2018-05-15 RX ORDER — METOPROLOL TARTRATE 50 MG
1 TABLET ORAL
Qty: 0 | Refills: 0 | COMMUNITY
Start: 2018-05-15

## 2018-05-15 RX ORDER — SERTRALINE 25 MG/1
1 TABLET, FILM COATED ORAL
Qty: 0 | Refills: 0 | COMMUNITY
Start: 2018-05-15

## 2018-05-15 RX ORDER — ENOXAPARIN SODIUM 100 MG/ML
40 INJECTION SUBCUTANEOUS
Qty: 0 | Refills: 0 | COMMUNITY
Start: 2018-05-15

## 2018-05-15 RX ADMIN — Medication 1000 UNIT(S): at 12:14

## 2018-05-15 RX ADMIN — Medication 25 MILLIGRAM(S): at 06:12

## 2018-05-15 RX ADMIN — LACOSAMIDE 50 MILLIGRAM(S): 50 TABLET ORAL at 17:02

## 2018-05-15 RX ADMIN — Medication 650 MILLIGRAM(S): at 18:16

## 2018-05-15 RX ADMIN — LEVETIRACETAM 1250 MILLIGRAM(S): 250 TABLET, FILM COATED ORAL at 17:02

## 2018-05-15 RX ADMIN — Medication 4 MILLIGRAM(S): at 12:15

## 2018-05-15 RX ADMIN — LACOSAMIDE 50 MILLIGRAM(S): 50 TABLET ORAL at 12:14

## 2018-05-15 RX ADMIN — SODIUM CHLORIDE 1 GRAM(S): 9 INJECTION INTRAMUSCULAR; INTRAVENOUS; SUBCUTANEOUS at 12:14

## 2018-05-15 RX ADMIN — Medication 100 MILLIGRAM(S): at 17:04

## 2018-05-15 RX ADMIN — SODIUM CHLORIDE 1 GRAM(S): 9 INJECTION INTRAMUSCULAR; INTRAVENOUS; SUBCUTANEOUS at 06:12

## 2018-05-15 RX ADMIN — SERTRALINE 100 MILLIGRAM(S): 25 TABLET, FILM COATED ORAL at 12:14

## 2018-05-15 RX ADMIN — Medication 25 MILLIGRAM(S): at 17:02

## 2018-05-15 RX ADMIN — LEVETIRACETAM 1250 MILLIGRAM(S): 250 TABLET, FILM COATED ORAL at 06:12

## 2018-05-15 RX ADMIN — Medication 4 MILLIGRAM(S): at 06:12

## 2018-05-15 NOTE — PROGRESS NOTE ADULT - PROBLEM SELECTOR PLAN 1
s/p biopsy   1 continue keppra 1250 mg BID   2 decadron to 4q8   3 pt/ot and pm&r consult appreciated   4 prn pain meds   5 dvt ppx sql scds   6 continue metoprolol   7 regular diet   8 stool softeners   9 dispo :p   GEORGE :p   10 splint in place for LUE s/p biopsy   1 continue keppra 1250 mg BID   2 decadron to 4q8   3 pt/ot and pm&r consult appreciated   4 prn pain meds   5 dvt ppx sql scds   6 continue metoprolol   7 regular diet   8 stool softeners   9 dispo :p   GEORGE :p   10 splint in place for LUE    No plan for chemo or radiation while in rehab

## 2018-05-15 NOTE — DISCHARGE NOTE ADULT - CARE PLAN
Principal Discharge DX:	Brain tumor  Goal:	Increase activity  Assessment and plan of treatment:	Follow up with Aaron Ybarra after discharge- Please call office to confirm appointment-Neurosurgeon.   Follow up with Dr. Katz -Neuro oncologist after rehab-Please call office to confirm appointment.   Follow up with primary care provider after rehab. Principal Discharge DX:	Brain tumor  Goal:	Increase activity  Assessment and plan of treatment:	Follow up with Aaron Ybarra after discharge- Please call office to confirm appointment-Neurosurgeon.   Follow up with primary care provider after rehab.

## 2018-05-15 NOTE — PROGRESS NOTE ADULT - PROBLEM SELECTOR PLAN 3
pt feels well on Metoprolol and doesn't want to change to guideline rec medication. she will f/u as outpt. risks/benefits d/w pt.
pt feels well on Metoprolol and doesn't want to change to guideline rec medication. she will f/u as outpt. risks/benefits d/w pt.

## 2018-05-15 NOTE — PROGRESS NOTE ADULT - SUBJECTIVE AND OBJECTIVE BOX
Rodrigo Blood   Pager [phone]  Office [phone]      CC: Patient is a 67y old  Female who presents with a chief complaint of "Here for a biopsy of my head" (10 May 2018 06:32)      SUBJECTIVE / OVERNIGHT EVENTS: Had facial twitching and left sided mouth numbness c possible LUE movement for 45 sec this a.m.. No HAs. No CP/SOB.     MEDICATIONS  (STANDING):  cholecalciferol 1000 Unit(s) Oral daily  dexamethasone     Tablet 4 milliGRAM(s) Oral every 8 hours  dextrose 5%. 1000 milliLiter(s) (50 mL/Hr) IV Continuous <Continuous>  dextrose 50% Injectable 12.5 Gram(s) IV Push once  dextrose 50% Injectable 25 Gram(s) IV Push once  dextrose 50% Injectable 25 Gram(s) IV Push once  docusate sodium 100 milliGRAM(s) Oral three times a day  enoxaparin Injectable 40 milliGRAM(s) SubCutaneous at bedtime  insulin lispro (HumaLOG) corrective regimen sliding scale   SubCutaneous three times a day before meals  insulin lispro (HumaLOG) corrective regimen sliding scale   SubCutaneous at bedtime  levETIRAcetam 1250 milliGRAM(s) Oral two times a day  metoprolol tartrate 25 milliGRAM(s) Oral two times a day  senna 2 Tablet(s) Oral at bedtime  sertraline 100 milliGRAM(s) Oral daily  sodium chloride 1 Gram(s) Oral three times a day    MEDICATIONS  (PRN):  acetaminophen   Tablet 650 milliGRAM(s) Oral every 6 hours PRN For Temp greater than 38 C (100.4 F)  acetaminophen   Tablet. 650 milliGRAM(s) Oral every 6 hours PRN Mild Pain (1 - 3)  dextrose 40% Gel 15 Gram(s) Oral once PRN Blood Glucose LESS THAN 70 milliGRAM(s)/deciliter  glucagon  Injectable 1 milliGRAM(s) IntraMuscular once PRN Glucose LESS THAN 70 milligrams/deciliter  ondansetron Injectable 4 milliGRAM(s) IV Push every 6 hours PRN Nausea and/or Vomiting  oxyCODONE    IR 5 milliGRAM(s) Oral every 4 hours PRN Moderate Pain (4 - 6)  oxyCODONE    IR 10 milliGRAM(s) Oral every 4 hours PRN Severe Pain (7 - 10)      Vital Signs Last 24 Hrs  T(C): 36.7 (15 May 2018 08:06), Max: 36.9 (14 May 2018 19:07)  T(F): 98 (15 May 2018 08:06), Max: 98.4 (14 May 2018 19:07)  HR: 63 (15 May 2018 08:06) (62 - 75)  BP: 144/52 (15 May 2018 08:06) (104/56 - 144/52)  BP(mean): --  RR: 20 (15 May 2018 08:06) (18 - 20)  SpO2: 99% (15 May 2018 08:06) (96% - 99%)  CAPILLARY BLOOD GLUCOSE      POCT Blood Glucose.: 98 mg/dL (15 May 2018 08:29)  POCT Blood Glucose.: 124 mg/dL (14 May 2018 21:07)  POCT Blood Glucose.: 111 mg/dL (14 May 2018 17:06)    I&O's Summary    14 May 2018 07:01  -  15 May 2018 07:00  --------------------------------------------------------  IN: 1080 mL / OUT: 0 mL / NET: 1080 mL    15 May 2018 07:01  -  15 May 2018 10:29  --------------------------------------------------------  IN: 300 mL / OUT: 0 mL / NET: 300 mL          PHYSICAL EXAM:    GENERAL: NAD   HEENT: EOMI, PERRL  PULM: Clear to auscultation bilaterally  CV: Regular rate and rhythm; nl S1, S2; No murmurs, rubs, or gallops  ABDOMEN: Soft, Nontender, Nondistended; Bowel sounds present  EXTREMITIES/MSK:  No edema, calf tenderness   PSYCH: AAOx3  NEUROLOGY: paraplegic; RUE 4/5 c contraction; LUE 0          LABS:                        11.7   11.43 )-----------( 301      ( 14 May 2018 07:14 )             38.0     05-15    138  |  100  |  24<H>  ----------------------------<  117<H>  4.3   |  26  |  0.52    Ca    8.8      15 May 2018 05:11                  RADIOLOGY & ADDITIONAL TESTS:    Imaging Personally Reviewed:    Consultant(s) Notes Reviewed:      Care Discussed with Consultants/Other Providers: neurosurg

## 2018-05-15 NOTE — DISCHARGE NOTE ADULT - REASON FOR ADMISSION
Patient was admitted on 05/10;  presents to [facility] for a Stereotactic Biopsy for Tumor PC on 5/10/18. 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 Abnormality seen on recent MRI brain (large multinodular enhancing lesion) from 4/23/18. Pt wheelchair bound since 1987. Her baseline 6 months ago was paraplegic, incontinent, with little function of her right UE. Since her seizure in April, she has had reduced function of left hand significantly affecting any independence. Denies chest pain or SOB.   Patient had a right craniotomy and biopsy of brain tumor on 5/10

## 2018-05-15 NOTE — DISCHARGE NOTE ADULT - MEDICATION SUMMARY - MEDICATIONS TO TAKE
I will START or STAY ON the medications listed below when I get home from the hospital:    dexamethasone 4 mg oral tablet  -- 1 tab(s) po every 8 hours for 1 days then change to 3mg every 8 hours for 3 days then 2mg q8 hours for 2 days then continue at 2mg every 12 hours until further follow up.   -- Indication: For Brain edema     oxyCODONE 5 mg oral tablet  -- 1 tab(s) by mouth every 4 hours, As needed, Moderate Pain (4 - 6)  -- Indication: For pain    acetaminophen 325 mg oral tablet  -- 2 tab(s) by mouth every 6 hours, As needed, Mild Pain (1 - 3)  -- Indication: For pain    oxyCODONE 10 mg oral tablet  -- 1 tab(s) by mouth every 4 hours, As needed, Severe Pain (7 - 10)  -- Indication: For pain    enoxaparin  -- 40 milligram(s) subcutaneous once a day (at bedtime)  -- Indication: For Dvt ppx     lacosamide 50 mg oral tablet  -- 1 tab(s) by mouth 2 times a day  -- Indication: For Seizure prevention    levETIRAcetam 250 mg oral tablet  -- 5 tab(s) by mouth 2 times a day  -- Indication: For Seizure prevention    sertraline 100 mg oral tablet  -- 1 tab(s) by mouth once a day  -- Indication: For Mood     metoprolol tartrate 25 mg oral tablet  -- 1 tab(s) by mouth 2 times a day  -- Indication: For Blood pressure     docusate sodium 100 mg oral capsule  -- 1 cap(s) by mouth 3 times a day  -- Indication: For Stool softener     senna oral tablet  -- 2 tab(s) by mouth once a day (at bedtime)  -- Indication: For Stool softener     cholecalciferol oral tablet  -- 1000 unit(s) by mouth once a day  -- Indication: For vitamin

## 2018-05-15 NOTE — PROGRESS NOTE ADULT - ASSESSMENT
HPI:  67yr old female presents to [facility] for a Stereotactic Biopsy for Tumor PC on 5/10/18. 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 Abnormality seen on recent MRI brain (large multinodular enhancing lesion) from 4/23/18. Pt wheelchair bound since 1987. Her baseline 6 months ago was paraplegic, incontinent, with little function of her right UE. Since her seizure in April, she has had reduced function of left hand significantly affecting any independence.        s/p right craniotomy and biopsy of brain tumor- 5/10

## 2018-05-15 NOTE — DISCHARGE NOTE ADULT - CARE PROVIDERS DIRECT ADDRESSES
,[email or web address]

## 2018-05-15 NOTE — DISCHARGE NOTE ADULT - PLAN OF CARE
Increase activity Follow up with Aaron Ybarra after discharge- Please call office to confirm appointment-Neurosurgeon.   Follow up with Dr. Katz -Neuro oncologist after rehab-Please call office to confirm appointment.   Follow up with primary care provider after rehab. Follow up with Aaron Ybarra after discharge- Please call office to confirm appointment-Neurosurgeon.   Follow up with primary care provider after rehab.

## 2018-05-15 NOTE — DISCHARGE NOTE ADULT - HOSPITAL COURSE
Patient had biopsy done on 5/10-right craniotomy and biospy of tumor. Post procedure patient was monitored in the icu. Patient was given pain meds , ivf and was started on steroid. Patient had follow up imaging of the brain which was stable. Patient was noted to have seizure activity and was on eeg monitoring . Patient's keppra dose was increased and vimpat was started along the way . Patint was transferred to floor. On the floor patient was seen by physical therapy and was recommended for subacute rehab. Patient was also seen by OT and PM&R. Patient also had iv infiltration on the left arm which has resolved with conservative management. Patient was discharged to rehab with follow up instructions,  decadron taper and seizure meds.

## 2018-05-15 NOTE — DISCHARGE NOTE ADULT - CARE PROVIDER_API CALL
Aaron Abarca), Neurological Surgery  [address] [place] [address]  Phone: [phone]  Fax: [phone]    Shelley Katz), Neurology  [facility]  [address] [place] [address]  Phone: [phone]  Fax: [phone]

## 2018-05-15 NOTE — DISCHARGE NOTE ADULT - PATIENT PORTAL LINK FT
You can access the "[employer] Patient Portal, offered by [facility], by registering with the following website: [email or web address]

## 2018-05-15 NOTE — PROGRESS NOTE ADULT - SUBJECTIVE AND OBJECTIVE BOX
SUBJECTIVE:  Patient was seen and evaluated at bedside. Patient is resting comfortably in bed and is in no new acute distress.     OVERNIGHT EVENTS: none     Vital Signs Last 24 Hrs  T(C): 36.7 (15 May 2018 08:06), Max: 36.9 (14 May 2018 19:07)  T(F): 98 (15 May 2018 08:06), Max: 98.4 (14 May 2018 19:07)  HR: 63 (15 May 2018 08:06) (62 - 75)  BP: 144/52 (15 May 2018 08:06) (104/56 - 144/52)  BP(mean): --  RR: 20 (15 May 2018 08:06) (18 - 20)  SpO2: 99% (15 May 2018 08:06) (96% - 99%)    PHYSICAL EXAM:    General: No Acute Distress     Neurological: Awake, alert oriented to person, place and time, Following Commands on right upper , PERRL, EOMI, has left facial, Speech Fluent,, Muscle Strength right upper 4 /5, left upper wds and b/l lower is plegic., Sensation to Light Touch Intact    Pulmonary: Clear to Auscultation, No Rales, No Rhonchi, No Wheezes     Cardiovascular: S1, S2, Regular Rate and Rhythm     Gastrointestinal: Soft, Nontender, Nondistended     Incision: clean and dry     LABS:                        11.7   11.43 )-----------( 301      ( 14 May 2018 07:14 )             38.0    05-15    138  |  100  |  24<H>  ----------------------------<  117<H>  4.3   |  26  |  0.52    Ca    8.8      15 May 2018 05:11      Hemoglobin A1C, Whole Blood: 5.0 % (05-15 @ 07:37)      05-14 @ 07:01  -  05-15 @ 07:00  --------------------------------------------------------  IN: 1080 mL / OUT: 0 mL / NET: 1080 mL    05-15 @ 07:01  -  05-15 @ 10:11  --------------------------------------------------------  IN: 300 mL / OUT: 0 mL / NET: 300 mL      DRAINS:     MEDICATIONS:  Antibiotics:    Neuro:  acetaminophen   Tablet 650 milliGRAM(s) Oral every 6 hours PRN For Temp greater than 38 C (100.4 F)  acetaminophen   Tablet. 650 milliGRAM(s) Oral every 6 hours PRN Mild Pain (1 - 3)  levETIRAcetam 1250 milliGRAM(s) Oral two times a day  ondansetron Injectable 4 milliGRAM(s) IV Push every 6 hours PRN Nausea and/or Vomiting  oxyCODONE    IR 5 milliGRAM(s) Oral every 4 hours PRN Moderate Pain (4 - 6)  oxyCODONE    IR 10 milliGRAM(s) Oral every 4 hours PRN Severe Pain (7 - 10)  sertraline 100 milliGRAM(s) Oral daily    Cardiac:  metoprolol tartrate 25 milliGRAM(s) Oral two times a day    Pulm:    GI/:  docusate sodium 100 milliGRAM(s) Oral three times a day  senna 2 Tablet(s) Oral at bedtime    Other:   cholecalciferol 1000 Unit(s) Oral daily  dexamethasone     Tablet 4 milliGRAM(s) Oral every 8 hours  dextrose 40% Gel 15 Gram(s) Oral once PRN Blood Glucose LESS THAN 70 milliGRAM(s)/deciliter  dextrose 5%. 1000 milliLiter(s) IV Continuous <Continuous>  dextrose 50% Injectable 12.5 Gram(s) IV Push once  dextrose 50% Injectable 25 Gram(s) IV Push once  dextrose 50% Injectable 25 Gram(s) IV Push once  enoxaparin Injectable 40 milliGRAM(s) SubCutaneous at bedtime  glucagon  Injectable 1 milliGRAM(s) IntraMuscular once PRN Glucose LESS THAN 70 milligrams/deciliter  insulin lispro (HumaLOG) corrective regimen sliding scale   SubCutaneous three times a day before meals  insulin lispro (HumaLOG) corrective regimen sliding scale   SubCutaneous at bedtime  sodium chloride 1 Gram(s) Oral three times a day    DIET: [] Regular [] CCD [] Renal [] Puree [] Dysphagia [] Tube Feeds: regular     IMAGING: no new imaging

## 2018-05-15 NOTE — DISCHARGE NOTE ADULT - NS AS ACTIVITY OBS
Stairs allowed/Walking-Outdoors allowed/No Heavy lifting/straining/Do not make important decisions/Showering allowed/Walking-Indoors allowed/Do not drive or operate machinery

## 2018-05-15 NOTE — DISCHARGE NOTE ADULT - MEDICATION SUMMARY - MEDICATIONS TO STOP TAKING
I will STOP taking the medications listed below when I get home from the hospital:    pindolol 5 mg oral tablet  -- 1 tab(s) by mouth 2 times a day    propantheline 15 mg oral tablet  -- 1 tab(s) by mouth once a day, As Needed    Keppra 500 mg oral tablet  -- 1 tab(s) by mouth 2 times a day

## 2018-05-21 ENCOUNTER — OUTPATIENT (OUTPATIENT)
Dept: OUTPATIENT SERVICES | Facility: HOSPITAL | Age: 68
LOS: 1 days | Discharge: HOME | End: 2018-05-21

## 2018-05-21 DIAGNOSIS — Z90.89 ACQUIRED ABSENCE OF OTHER ORGANS: Chronic | ICD-10-CM

## 2018-05-23 ENCOUNTER — APPOINTMENT (OUTPATIENT)
Dept: HEMATOLOGY ONCOLOGY | Facility: CLINIC | Age: 68
End: 2018-05-23

## 2018-05-23 ENCOUNTER — APPOINTMENT (OUTPATIENT)
Dept: NEUROLOGY | Facility: CLINIC | Age: 68
End: 2018-05-23
Payer: MEDICARE

## 2018-05-23 ENCOUNTER — RESULT REVIEW (OUTPATIENT)
Age: 68
End: 2018-05-23

## 2018-05-23 ENCOUNTER — APPOINTMENT (OUTPATIENT)
Dept: NEUROSURGERY | Facility: CLINIC | Age: 68
End: 2018-05-23
Payer: MEDICARE

## 2018-05-23 ENCOUNTER — OUTPATIENT (OUTPATIENT)
Dept: OUTPATIENT SERVICES | Facility: HOSPITAL | Age: 68
LOS: 1 days | Discharge: HOME | End: 2018-05-23

## 2018-05-23 ENCOUNTER — APPOINTMENT (OUTPATIENT)
Dept: RADIATION ONCOLOGY | Facility: CLINIC | Age: 68
End: 2018-05-23
Payer: MEDICARE

## 2018-05-23 VITALS
OXYGEN SATURATION: 98 % | RESPIRATION RATE: 16 BRPM | HEART RATE: 86 BPM | DIASTOLIC BLOOD PRESSURE: 70 MMHG | TEMPERATURE: 98.2 F | SYSTOLIC BLOOD PRESSURE: 119 MMHG | HEIGHT: 64 IN | WEIGHT: 109 LBS | BODY MASS INDEX: 18.61 KG/M2

## 2018-05-23 VITALS
HEART RATE: 64 BPM | DIASTOLIC BLOOD PRESSURE: 66 MMHG | WEIGHT: 109 LBS | OXYGEN SATURATION: 99 % | SYSTOLIC BLOOD PRESSURE: 109 MMHG | HEIGHT: 64 IN | BODY MASS INDEX: 18.61 KG/M2 | RESPIRATION RATE: 15 BRPM | TEMPERATURE: 98.6 F

## 2018-05-23 DIAGNOSIS — C71.9 MALIGNANT NEOPLASM OF BRAIN, UNSPECIFIED: ICD-10-CM

## 2018-05-23 DIAGNOSIS — R56.9 UNSPECIFIED CONVULSIONS: ICD-10-CM

## 2018-05-23 DIAGNOSIS — Z80.8 FAMILY HISTORY OF MALIGNANT NEOPLASM OF OTHER ORGANS OR SYSTEMS: ICD-10-CM

## 2018-05-23 DIAGNOSIS — Z98.890 OTHER SPECIFIED POSTPROCEDURAL STATES: ICD-10-CM

## 2018-05-23 DIAGNOSIS — K59.00 CONSTIPATION, UNSPECIFIED: ICD-10-CM

## 2018-05-23 DIAGNOSIS — Z90.89 ACQUIRED ABSENCE OF OTHER ORGANS: Chronic | ICD-10-CM

## 2018-05-23 DIAGNOSIS — F32.9 MAJOR DEPRESSIVE DISORDER, SINGLE EPISODE, UNSPECIFIED: ICD-10-CM

## 2018-05-23 LAB
BASOPHILS # BLD AUTO: 0.2 K/UL — SIGNIFICANT CHANGE UP (ref 0–0.2)
BASOPHILS NFR BLD AUTO: 0.7 % — SIGNIFICANT CHANGE UP (ref 0–2)
EOSINOPHIL # BLD AUTO: 0.5 K/UL — SIGNIFICANT CHANGE UP (ref 0–0.5)
EOSINOPHIL NFR BLD AUTO: 2.4 % — SIGNIFICANT CHANGE UP (ref 0–6)
HCT VFR BLD CALC: 37.4 % — SIGNIFICANT CHANGE UP (ref 34.5–45)
HGB BLD-MCNC: 12.9 G/DL — SIGNIFICANT CHANGE UP (ref 11.5–15.5)
LYMPHOCYTES # BLD AUTO: 12.9 % — LOW (ref 13–44)
LYMPHOCYTES # BLD AUTO: 2.8 K/UL — SIGNIFICANT CHANGE UP (ref 1–3.3)
MCHC RBC-ENTMCNC: 31.4 PG — SIGNIFICANT CHANGE UP (ref 27–34)
MCHC RBC-ENTMCNC: 34.4 G/DL — SIGNIFICANT CHANGE UP (ref 32–36)
MCV RBC AUTO: 91.3 FL — SIGNIFICANT CHANGE UP (ref 80–100)
MONOCYTES # BLD AUTO: 1.1 K/UL — HIGH (ref 0–0.9)
MONOCYTES NFR BLD AUTO: 5.1 % — SIGNIFICANT CHANGE UP (ref 2–14)
NEUTROPHILS # BLD AUTO: 16.9 K/UL — HIGH (ref 1.8–7.4)
NEUTROPHILS NFR BLD AUTO: 78.9 % — HIGH (ref 43–77)
PLATELET # BLD AUTO: 416 K/UL — HIGH (ref 150–400)
RBC # BLD: 4.1 M/UL — SIGNIFICANT CHANGE UP (ref 3.8–5.2)
RBC # FLD: 12.2 % — SIGNIFICANT CHANGE UP (ref 10.3–14.5)
WBC # BLD: 21.4 K/UL — HIGH (ref 3.8–10.5)
WBC # FLD AUTO: 21.4 K/UL — HIGH (ref 3.8–10.5)

## 2018-05-23 PROCEDURE — 97162 PT EVAL MOD COMPLEX 30 MIN: CPT

## 2018-05-23 PROCEDURE — 83735 ASSAY OF MAGNESIUM: CPT

## 2018-05-23 PROCEDURE — 80048 BASIC METABOLIC PNL TOTAL CA: CPT

## 2018-05-23 PROCEDURE — 70555 FMRI BRAIN BY PHYS/PSYCH: CPT

## 2018-05-23 PROCEDURE — 86850 RBC ANTIBODY SCREEN: CPT

## 2018-05-23 PROCEDURE — C1769: CPT

## 2018-05-23 PROCEDURE — 88331 PATH CONSLTJ SURG 1 BLK 1SPC: CPT

## 2018-05-23 PROCEDURE — 86900 BLOOD TYPING SEROLOGIC ABO: CPT

## 2018-05-23 PROCEDURE — 88307 TISSUE EXAM BY PATHOLOGIST: CPT

## 2018-05-23 PROCEDURE — 99024 POSTOP FOLLOW-UP VISIT: CPT

## 2018-05-23 PROCEDURE — 95819 EEG AWAKE AND ASLEEP: CPT

## 2018-05-23 PROCEDURE — C1713: CPT

## 2018-05-23 PROCEDURE — 88360 TUMOR IMMUNOHISTOCHEM/MANUAL: CPT

## 2018-05-23 PROCEDURE — 70450 CT HEAD/BRAIN W/O DYE: CPT

## 2018-05-23 PROCEDURE — 70553 MRI BRAIN STEM W/O & W/DYE: CPT

## 2018-05-23 PROCEDURE — G0463: CPT

## 2018-05-23 PROCEDURE — 97760 ORTHOTIC MGMT&TRAING 1ST ENC: CPT

## 2018-05-23 PROCEDURE — A9585: CPT

## 2018-05-23 PROCEDURE — 85027 COMPLETE CBC AUTOMATED: CPT

## 2018-05-23 PROCEDURE — 87641 MR-STAPH DNA AMP PROBE: CPT

## 2018-05-23 PROCEDURE — 88342 IMHCHEM/IMCYTCHM 1ST ANTB: CPT

## 2018-05-23 PROCEDURE — 97166 OT EVAL MOD COMPLEX 45 MIN: CPT

## 2018-05-23 PROCEDURE — 88341 IMHCHEM/IMCYTCHM EA ADD ANTB: CPT

## 2018-05-23 PROCEDURE — 71045 X-RAY EXAM CHEST 1 VIEW: CPT

## 2018-05-23 PROCEDURE — 88333 PATH CONSLTJ SURG CYTO XM 1: CPT

## 2018-05-23 PROCEDURE — 95951: CPT

## 2018-05-23 PROCEDURE — 87640 STAPH A DNA AMP PROBE: CPT

## 2018-05-23 PROCEDURE — 84100 ASSAY OF PHOSPHORUS: CPT

## 2018-05-23 PROCEDURE — 99215 OFFICE O/P EST HI 40 MIN: CPT

## 2018-05-23 PROCEDURE — 99205 OFFICE O/P NEW HI 60 MIN: CPT | Mod: 25

## 2018-05-23 PROCEDURE — 83036 HEMOGLOBIN GLYCOSYLATED A1C: CPT

## 2018-05-23 PROCEDURE — 82962 GLUCOSE BLOOD TEST: CPT

## 2018-05-23 PROCEDURE — C1889: CPT

## 2018-05-23 PROCEDURE — 86901 BLOOD TYPING SEROLOGIC RH(D): CPT

## 2018-05-23 RX ORDER — LACOSAMIDE 50 MG/1
50 TABLET, FILM COATED ORAL TWICE DAILY
Qty: 60 | Refills: 0 | Status: ACTIVE | COMMUNITY

## 2018-05-23 RX ORDER — LEVETIRACETAM 500 MG/1
500 TABLET, FILM COATED ORAL TWICE DAILY
Qty: 120 | Refills: 3 | Status: ACTIVE | COMMUNITY
Start: 2018-05-03

## 2018-05-23 RX ORDER — SERTRALINE HYDROCHLORIDE 100 MG/1
100 TABLET, FILM COATED ORAL DAILY
Qty: 30 | Refills: 0 | Status: ACTIVE | COMMUNITY

## 2018-05-23 RX ORDER — SENNOSIDES 8.6 MG/1
8.6 CAPSULE, GELATIN COATED ORAL
Qty: 60 | Refills: 0 | Status: ACTIVE | COMMUNITY
Start: 2018-05-23

## 2018-05-23 RX ORDER — PINDOLOL 5 MG/1
5 TABLET ORAL
Refills: 0 | Status: DISCONTINUED | COMMUNITY
End: 2018-05-23

## 2018-05-23 RX ORDER — METOPROLOL TARTRATE 25 MG/1
25 TABLET, FILM COATED ORAL TWICE DAILY
Qty: 30 | Refills: 0 | Status: ACTIVE | COMMUNITY
Start: 2018-05-23

## 2018-05-23 RX ORDER — CRANBERRY FRUIT EXTRACT 200 MG
CAPSULE ORAL
Refills: 0 | Status: ACTIVE | COMMUNITY

## 2018-05-23 RX ORDER — CHROMIUM 200 MCG
TABLET ORAL
Refills: 0 | Status: DISCONTINUED | COMMUNITY
End: 2018-05-23

## 2018-05-23 RX ORDER — DOCUSATE SODIUM 100 MG/1
100 CAPSULE ORAL 3 TIMES DAILY
Qty: 60 | Refills: 0 | Status: ACTIVE | COMMUNITY
Start: 2018-05-23

## 2018-05-23 RX ORDER — MULTIVIT-MIN/FOLIC/VIT K/LYCOP 400-300MCG
25 MCG TABLET ORAL DAILY
Qty: 30 | Refills: 0 | Status: ACTIVE | COMMUNITY
Start: 2018-05-23

## 2018-05-23 RX ORDER — ENOXAPARIN SODIUM 100 MG/ML
40 INJECTION SUBCUTANEOUS DAILY
Qty: 30 | Refills: 0 | Status: ACTIVE | COMMUNITY
Start: 2018-05-23

## 2018-05-24 LAB
ALBUMIN SERPL ELPH-MCNC: 3.5 G/DL
ALP BLD-CCNC: 84 U/L
ALT SERPL-CCNC: 24 U/L
ANION GAP SERPL CALC-SCNC: 12 MMOL/L
AST SERPL-CCNC: 15 U/L
BILIRUB SERPL-MCNC: 0.3 MG/DL
BUN SERPL-MCNC: 31 MG/DL
CALCIUM SERPL-MCNC: 9.1 MG/DL
CHLORIDE SERPL-SCNC: 98 MMOL/L
CO2 SERPL-SCNC: 26 MMOL/L
CREAT SERPL-MCNC: 0.53 MG/DL
GLUCOSE SERPL-MCNC: 109 MG/DL
HAV IGM SER QL: NONREACTIVE
HBV CORE IGM SER QL: NONREACTIVE
HBV SURFACE AG SER QL: NONREACTIVE
HCV AB SER QL: NONREACTIVE
HCV S/CO RATIO: 0.08 S/CO
POTASSIUM SERPL-SCNC: 4.6 MMOL/L
PROT SERPL-MCNC: 6.3 G/DL
SODIUM SERPL-SCNC: 136 MMOL/L

## 2018-05-30 ENCOUNTER — CLINICAL ADVICE (OUTPATIENT)
Age: 68
End: 2018-05-30

## 2018-05-30 ENCOUNTER — OTHER (OUTPATIENT)
Age: 68
End: 2018-05-30

## 2018-06-06 ENCOUNTER — OTHER (OUTPATIENT)
Age: 68
End: 2018-06-06

## 2018-06-07 ENCOUNTER — RESULT REVIEW (OUTPATIENT)
Age: 68
End: 2018-06-07

## 2018-06-07 ENCOUNTER — APPOINTMENT (OUTPATIENT)
Dept: NEUROLOGY | Facility: CLINIC | Age: 68
End: 2018-06-07
Payer: MEDICARE

## 2018-06-07 ENCOUNTER — APPOINTMENT (OUTPATIENT)
Dept: NEUROLOGY | Facility: CLINIC | Age: 68
End: 2018-06-07

## 2018-06-07 ENCOUNTER — LABORATORY RESULT (OUTPATIENT)
Age: 68
End: 2018-06-07

## 2018-06-07 ENCOUNTER — APPOINTMENT (OUTPATIENT)
Dept: INFUSION THERAPY | Facility: HOSPITAL | Age: 68
End: 2018-06-07

## 2018-06-07 VITALS
TEMPERATURE: 98 F | DIASTOLIC BLOOD PRESSURE: 75 MMHG | RESPIRATION RATE: 16 BRPM | HEART RATE: 52 BPM | SYSTOLIC BLOOD PRESSURE: 122 MMHG

## 2018-06-07 VITALS — HEIGHT: 64 IN | BODY MASS INDEX: 17.93 KG/M2 | WEIGHT: 105 LBS

## 2018-06-07 LAB
HCT VFR BLD CALC: 41.5 % — SIGNIFICANT CHANGE UP (ref 34.5–45)
HGB BLD-MCNC: 13.7 G/DL — SIGNIFICANT CHANGE UP (ref 11.5–15.5)
MCHC RBC-ENTMCNC: 30.8 PG — SIGNIFICANT CHANGE UP (ref 27–34)
MCHC RBC-ENTMCNC: 33 G/DL — SIGNIFICANT CHANGE UP (ref 32–36)
MCV RBC AUTO: 93.4 FL — SIGNIFICANT CHANGE UP (ref 80–100)
PLATELET # BLD AUTO: 258 K/UL — SIGNIFICANT CHANGE UP (ref 150–400)
RBC # BLD: 4.45 M/UL — SIGNIFICANT CHANGE UP (ref 3.8–5.2)
RBC # FLD: 12.7 % — SIGNIFICANT CHANGE UP (ref 10.3–14.5)
WBC # BLD: 8.9 K/UL — SIGNIFICANT CHANGE UP (ref 3.8–10.5)
WBC # FLD AUTO: 8.9 K/UL — SIGNIFICANT CHANGE UP (ref 3.8–10.5)

## 2018-06-07 PROCEDURE — 99214 OFFICE O/P EST MOD 30 MIN: CPT

## 2018-06-11 DIAGNOSIS — Z51.11 ENCOUNTER FOR ANTINEOPLASTIC CHEMOTHERAPY: ICD-10-CM

## 2018-06-11 DIAGNOSIS — R11.2 NAUSEA WITH VOMITING, UNSPECIFIED: ICD-10-CM

## 2018-06-11 DIAGNOSIS — E86.0 DEHYDRATION: ICD-10-CM

## 2018-06-21 ENCOUNTER — LABORATORY RESULT (OUTPATIENT)
Age: 68
End: 2018-06-21

## 2018-06-21 ENCOUNTER — APPOINTMENT (OUTPATIENT)
Dept: INFUSION THERAPY | Facility: HOSPITAL | Age: 68
End: 2018-06-21

## 2018-06-21 ENCOUNTER — RESULT REVIEW (OUTPATIENT)
Age: 68
End: 2018-06-21

## 2018-06-21 LAB
BASOPHILS # BLD AUTO: 0.1 K/UL — SIGNIFICANT CHANGE UP (ref 0–0.2)
BASOPHILS NFR BLD AUTO: 1.2 % — SIGNIFICANT CHANGE UP (ref 0–2)
EOSINOPHIL # BLD AUTO: 0.2 K/UL — SIGNIFICANT CHANGE UP (ref 0–0.5)
EOSINOPHIL NFR BLD AUTO: 1.4 % — SIGNIFICANT CHANGE UP (ref 0–6)
HCT VFR BLD CALC: 41.3 % — SIGNIFICANT CHANGE UP (ref 34.5–45)
HGB BLD-MCNC: 13.9 G/DL — SIGNIFICANT CHANGE UP (ref 11.5–15.5)
LYMPHOCYTES # BLD AUTO: 18.2 % — SIGNIFICANT CHANGE UP (ref 13–44)
LYMPHOCYTES # BLD AUTO: 2.1 K/UL — SIGNIFICANT CHANGE UP (ref 1–3.3)
MCHC RBC-ENTMCNC: 30.9 PG — SIGNIFICANT CHANGE UP (ref 27–34)
MCHC RBC-ENTMCNC: 33.6 G/DL — SIGNIFICANT CHANGE UP (ref 32–36)
MCV RBC AUTO: 92.1 FL — SIGNIFICANT CHANGE UP (ref 80–100)
MONOCYTES # BLD AUTO: 0.9 K/UL — SIGNIFICANT CHANGE UP (ref 0–0.9)
MONOCYTES NFR BLD AUTO: 7.4 % — SIGNIFICANT CHANGE UP (ref 2–14)
NEUTROPHILS # BLD AUTO: 8.4 K/UL — HIGH (ref 1.8–7.4)
NEUTROPHILS NFR BLD AUTO: 71.8 % — SIGNIFICANT CHANGE UP (ref 43–77)
PLATELET # BLD AUTO: 338 K/UL — SIGNIFICANT CHANGE UP (ref 150–400)
RBC # BLD: 4.49 M/UL — SIGNIFICANT CHANGE UP (ref 3.8–5.2)
RBC # FLD: 12.2 % — SIGNIFICANT CHANGE UP (ref 10.3–14.5)
WBC # BLD: 11.7 K/UL — HIGH (ref 3.8–10.5)
WBC # FLD AUTO: 11.7 K/UL — HIGH (ref 3.8–10.5)

## 2018-06-28 ENCOUNTER — OUTPATIENT (OUTPATIENT)
Dept: OUTPATIENT SERVICES | Facility: HOSPITAL | Age: 68
LOS: 1 days | Discharge: HOME | End: 2018-06-28

## 2018-06-28 DIAGNOSIS — C71.9 MALIGNANT NEOPLASM OF BRAIN, UNSPECIFIED: ICD-10-CM

## 2018-06-28 DIAGNOSIS — Z90.89 ACQUIRED ABSENCE OF OTHER ORGANS: Chronic | ICD-10-CM

## 2018-07-05 ENCOUNTER — RESULT REVIEW (OUTPATIENT)
Age: 68
End: 2018-07-05

## 2018-07-05 ENCOUNTER — LABORATORY RESULT (OUTPATIENT)
Age: 68
End: 2018-07-05

## 2018-07-05 ENCOUNTER — APPOINTMENT (OUTPATIENT)
Dept: NEUROLOGY | Facility: CLINIC | Age: 68
End: 2018-07-05
Payer: MEDICARE

## 2018-07-05 ENCOUNTER — OUTPATIENT (OUTPATIENT)
Dept: OUTPATIENT SERVICES | Facility: HOSPITAL | Age: 68
LOS: 1 days | End: 2018-07-05
Payer: MEDICARE

## 2018-07-05 ENCOUNTER — APPOINTMENT (OUTPATIENT)
Dept: INFUSION THERAPY | Facility: HOSPITAL | Age: 68
End: 2018-07-05

## 2018-07-05 ENCOUNTER — APPOINTMENT (OUTPATIENT)
Dept: MRI IMAGING | Facility: IMAGING CENTER | Age: 68
End: 2018-07-05
Payer: MEDICARE

## 2018-07-05 VITALS
TEMPERATURE: 98.2 F | HEART RATE: 59 BPM | RESPIRATION RATE: 18 BRPM | SYSTOLIC BLOOD PRESSURE: 115 MMHG | DIASTOLIC BLOOD PRESSURE: 66 MMHG | OXYGEN SATURATION: 98 %

## 2018-07-05 DIAGNOSIS — G93.9 DISORDER OF BRAIN, UNSPECIFIED: ICD-10-CM

## 2018-07-05 DIAGNOSIS — Z90.89 ACQUIRED ABSENCE OF OTHER ORGANS: Chronic | ICD-10-CM

## 2018-07-05 DIAGNOSIS — R51 HEADACHE: ICD-10-CM

## 2018-07-05 LAB
HCT VFR BLD CALC: 41.3 % — SIGNIFICANT CHANGE UP (ref 34.5–45)
HGB BLD-MCNC: 14 G/DL — SIGNIFICANT CHANGE UP (ref 11.5–15.5)
MCHC RBC-ENTMCNC: 31.1 PG — SIGNIFICANT CHANGE UP (ref 27–34)
MCHC RBC-ENTMCNC: 33.9 G/DL — SIGNIFICANT CHANGE UP (ref 32–36)
MCV RBC AUTO: 91.7 FL — SIGNIFICANT CHANGE UP (ref 80–100)
PLATELET # BLD AUTO: 229 K/UL — SIGNIFICANT CHANGE UP (ref 150–400)
RBC # BLD: 4.5 M/UL — SIGNIFICANT CHANGE UP (ref 3.8–5.2)
RBC # FLD: 12.3 % — SIGNIFICANT CHANGE UP (ref 10.3–14.5)
WBC # BLD: 14.5 K/UL — HIGH (ref 3.8–10.5)
WBC # FLD AUTO: 14.5 K/UL — HIGH (ref 3.8–10.5)

## 2018-07-05 PROCEDURE — 70553 MRI BRAIN STEM W/O & W/DYE: CPT | Mod: 26

## 2018-07-05 PROCEDURE — A9585: CPT

## 2018-07-05 PROCEDURE — 99214 OFFICE O/P EST MOD 30 MIN: CPT

## 2018-07-05 PROCEDURE — 70553 MRI BRAIN STEM W/O & W/DYE: CPT

## 2018-07-06 DIAGNOSIS — Z51.11 ENCOUNTER FOR ANTINEOPLASTIC CHEMOTHERAPY: ICD-10-CM

## 2018-07-11 ENCOUNTER — OTHER (OUTPATIENT)
Age: 68
End: 2018-07-11

## 2018-07-19 ENCOUNTER — LABORATORY RESULT (OUTPATIENT)
Age: 68
End: 2018-07-19

## 2018-07-19 ENCOUNTER — RESULT REVIEW (OUTPATIENT)
Age: 68
End: 2018-07-19

## 2018-07-19 ENCOUNTER — APPOINTMENT (OUTPATIENT)
Dept: INFUSION THERAPY | Facility: HOSPITAL | Age: 68
End: 2018-07-19

## 2018-07-19 LAB
HCT VFR BLD CALC: 40.7 % — SIGNIFICANT CHANGE UP (ref 34.5–45)
HGB BLD-MCNC: 14.1 G/DL — SIGNIFICANT CHANGE UP (ref 11.5–15.5)
MCHC RBC-ENTMCNC: 31.5 PG — SIGNIFICANT CHANGE UP (ref 27–34)
MCHC RBC-ENTMCNC: 34.7 G/DL — SIGNIFICANT CHANGE UP (ref 32–36)
MCV RBC AUTO: 90.7 FL — SIGNIFICANT CHANGE UP (ref 80–100)
PLATELET # BLD AUTO: 318 K/UL — SIGNIFICANT CHANGE UP (ref 150–400)
RBC # BLD: 4.48 M/UL — SIGNIFICANT CHANGE UP (ref 3.8–5.2)
RBC # FLD: 12.4 % — SIGNIFICANT CHANGE UP (ref 10.3–14.5)
WBC # BLD: 13.7 K/UL — HIGH (ref 3.8–10.5)
WBC # FLD AUTO: 13.7 K/UL — HIGH (ref 3.8–10.5)

## 2018-07-25 ENCOUNTER — OUTPATIENT (OUTPATIENT)
Dept: OUTPATIENT SERVICES | Facility: HOSPITAL | Age: 68
LOS: 1 days | Discharge: HOME | End: 2018-07-25

## 2018-07-25 DIAGNOSIS — Z90.89 ACQUIRED ABSENCE OF OTHER ORGANS: Chronic | ICD-10-CM

## 2018-07-25 DIAGNOSIS — C71.9 MALIGNANT NEOPLASM OF BRAIN, UNSPECIFIED: ICD-10-CM

## 2018-07-25 PROBLEM — Z99.3 DEPENDENCE ON WHEELCHAIR: Chronic | Status: ACTIVE | Noted: 2018-05-07

## 2018-07-25 PROBLEM — D49.6 NEOPLASM OF UNSPECIFIED BEHAVIOR OF BRAIN: Chronic | Status: ACTIVE | Noted: 2018-05-07

## 2018-07-25 PROBLEM — G35 MULTIPLE SCLEROSIS: Chronic | Status: ACTIVE | Noted: 2018-05-07

## 2018-07-25 PROBLEM — I10 ESSENTIAL (PRIMARY) HYPERTENSION: Chronic | Status: ACTIVE | Noted: 2018-05-07

## 2018-07-25 PROBLEM — R56.9 UNSPECIFIED CONVULSIONS: Chronic | Status: ACTIVE | Noted: 2018-05-07

## 2018-08-02 ENCOUNTER — LABORATORY RESULT (OUTPATIENT)
Age: 68
End: 2018-08-02

## 2018-08-02 ENCOUNTER — RESULT REVIEW (OUTPATIENT)
Age: 68
End: 2018-08-02

## 2018-08-02 ENCOUNTER — APPOINTMENT (OUTPATIENT)
Dept: INFUSION THERAPY | Facility: HOSPITAL | Age: 68
End: 2018-08-02

## 2018-08-02 LAB
BASOPHILS # BLD AUTO: 0.1 K/UL — SIGNIFICANT CHANGE UP (ref 0–0.2)
BASOPHILS NFR BLD AUTO: 0.7 % — SIGNIFICANT CHANGE UP (ref 0–2)
EOSINOPHIL # BLD AUTO: 0.1 K/UL — SIGNIFICANT CHANGE UP (ref 0–0.5)
EOSINOPHIL NFR BLD AUTO: 0.9 % — SIGNIFICANT CHANGE UP (ref 0–6)
HCT VFR BLD CALC: 42.4 % — SIGNIFICANT CHANGE UP (ref 34.5–45)
HGB BLD-MCNC: 14.1 G/DL — SIGNIFICANT CHANGE UP (ref 11.5–15.5)
LYMPHOCYTES # BLD AUTO: 1.7 K/UL — SIGNIFICANT CHANGE UP (ref 1–3.3)
LYMPHOCYTES # BLD AUTO: 10.5 % — LOW (ref 13–44)
MCHC RBC-ENTMCNC: 30.6 PG — SIGNIFICANT CHANGE UP (ref 27–34)
MCHC RBC-ENTMCNC: 33.3 G/DL — SIGNIFICANT CHANGE UP (ref 32–36)
MCV RBC AUTO: 91.8 FL — SIGNIFICANT CHANGE UP (ref 80–100)
MONOCYTES # BLD AUTO: 1 K/UL — HIGH (ref 0–0.9)
MONOCYTES NFR BLD AUTO: 6.6 % — SIGNIFICANT CHANGE UP (ref 2–14)
NEUTROPHILS # BLD AUTO: 12.9 K/UL — HIGH (ref 1.8–7.4)
NEUTROPHILS NFR BLD AUTO: 81.4 % — HIGH (ref 43–77)
PLATELET # BLD AUTO: 325 K/UL — SIGNIFICANT CHANGE UP (ref 150–400)
RBC # BLD: 4.62 M/UL — SIGNIFICANT CHANGE UP (ref 3.8–5.2)
RBC # FLD: 12.3 % — SIGNIFICANT CHANGE UP (ref 10.3–14.5)
WBC # BLD: 15.9 K/UL — HIGH (ref 3.8–10.5)
WBC # FLD AUTO: 15.9 K/UL — HIGH (ref 3.8–10.5)

## 2018-08-03 DIAGNOSIS — Z51.11 ENCOUNTER FOR ANTINEOPLASTIC CHEMOTHERAPY: ICD-10-CM

## 2018-08-16 ENCOUNTER — LABORATORY RESULT (OUTPATIENT)
Age: 68
End: 2018-08-16

## 2018-08-16 ENCOUNTER — APPOINTMENT (OUTPATIENT)
Dept: INFUSION THERAPY | Facility: HOSPITAL | Age: 68
End: 2018-08-16

## 2018-08-16 ENCOUNTER — RESULT REVIEW (OUTPATIENT)
Age: 68
End: 2018-08-16

## 2018-08-16 ENCOUNTER — APPOINTMENT (OUTPATIENT)
Dept: NEUROLOGY | Facility: CLINIC | Age: 68
End: 2018-08-16
Payer: MEDICARE

## 2018-08-16 VITALS
SYSTOLIC BLOOD PRESSURE: 111 MMHG | HEART RATE: 67 BPM | DIASTOLIC BLOOD PRESSURE: 68 MMHG | OXYGEN SATURATION: 94 % | HEIGHT: 64 IN | TEMPERATURE: 97.9 F | RESPIRATION RATE: 18 BRPM

## 2018-08-16 LAB
BASOPHILS # BLD AUTO: 0.1 K/UL — SIGNIFICANT CHANGE UP (ref 0–0.2)
BASOPHILS NFR BLD AUTO: 0.6 % — SIGNIFICANT CHANGE UP (ref 0–2)
EOSINOPHIL # BLD AUTO: 0.2 K/UL — SIGNIFICANT CHANGE UP (ref 0–0.5)
EOSINOPHIL NFR BLD AUTO: 0.8 % — SIGNIFICANT CHANGE UP (ref 0–6)
HCT VFR BLD CALC: 41.9 % — SIGNIFICANT CHANGE UP (ref 34.5–45)
HGB BLD-MCNC: 14 G/DL — SIGNIFICANT CHANGE UP (ref 11.5–15.5)
LYMPHOCYTES # BLD AUTO: 10.1 % — LOW (ref 13–44)
LYMPHOCYTES # BLD AUTO: 2 K/UL — SIGNIFICANT CHANGE UP (ref 1–3.3)
MCHC RBC-ENTMCNC: 30.5 PG — SIGNIFICANT CHANGE UP (ref 27–34)
MCHC RBC-ENTMCNC: 33.5 G/DL — SIGNIFICANT CHANGE UP (ref 32–36)
MCV RBC AUTO: 90.9 FL — SIGNIFICANT CHANGE UP (ref 80–100)
MONOCYTES # BLD AUTO: 0.5 K/UL — SIGNIFICANT CHANGE UP (ref 0–0.9)
MONOCYTES NFR BLD AUTO: 2.6 % — SIGNIFICANT CHANGE UP (ref 2–14)
NEUTROPHILS # BLD AUTO: 16.8 K/UL — HIGH (ref 1.8–7.4)
NEUTROPHILS NFR BLD AUTO: 85.8 % — HIGH (ref 43–77)
PLATELET # BLD AUTO: 504 K/UL — HIGH (ref 150–400)
RBC # BLD: 4.61 M/UL — SIGNIFICANT CHANGE UP (ref 3.8–5.2)
RBC # FLD: 12.6 % — SIGNIFICANT CHANGE UP (ref 10.3–14.5)
WBC # BLD: 19.6 K/UL — HIGH (ref 3.8–10.5)
WBC # FLD AUTO: 19.6 K/UL — HIGH (ref 3.8–10.5)

## 2018-08-16 PROCEDURE — 99214 OFFICE O/P EST MOD 30 MIN: CPT

## 2018-08-16 RX ORDER — DEXAMETHASONE 2 MG/1
2 TABLET ORAL DAILY
Qty: 30 | Refills: 0 | Status: ACTIVE | COMMUNITY
Start: 2018-08-16

## 2018-08-16 RX ORDER — DEXAMETHASONE 4 MG/1
4 TABLET ORAL DAILY
Qty: 30 | Refills: 0 | Status: ACTIVE | COMMUNITY
Start: 2018-05-23

## 2018-08-16 RX ORDER — FAMOTIDINE 40 MG/1
40 TABLET, FILM COATED ORAL DAILY
Qty: 30 | Refills: 0 | Status: DISCONTINUED | COMMUNITY
Start: 2018-05-23 | End: 2018-08-16

## 2018-08-16 RX ORDER — PANTOPRAZOLE 40 MG/1
40 TABLET, DELAYED RELEASE ORAL DAILY
Qty: 30 | Refills: 5 | Status: ACTIVE | COMMUNITY
Start: 2018-08-16

## 2018-08-28 ENCOUNTER — APPOINTMENT (OUTPATIENT)
Dept: MRI IMAGING | Facility: CLINIC | Age: 68
End: 2018-08-28

## 2018-08-30 ENCOUNTER — APPOINTMENT (OUTPATIENT)
Dept: NEUROLOGY | Facility: CLINIC | Age: 68
End: 2018-08-30

## 2018-09-07 ENCOUNTER — OTHER (OUTPATIENT)
Age: 68
End: 2018-09-07

## 2018-09-19 ENCOUNTER — OUTPATIENT (OUTPATIENT)
Dept: OUTPATIENT SERVICES | Facility: HOSPITAL | Age: 68
LOS: 1 days | End: 2018-09-19

## 2018-09-19 ENCOUNTER — APPOINTMENT (OUTPATIENT)
Dept: MRI IMAGING | Facility: CLINIC | Age: 68
End: 2018-09-19
Payer: MEDICARE

## 2018-09-19 DIAGNOSIS — Z90.89 ACQUIRED ABSENCE OF OTHER ORGANS: Chronic | ICD-10-CM

## 2018-09-19 DIAGNOSIS — Z00.8 ENCOUNTER FOR OTHER GENERAL EXAMINATION: ICD-10-CM

## 2018-09-19 PROCEDURE — 70553 MRI BRAIN STEM W/O & W/DYE: CPT | Mod: 26

## 2018-09-25 ENCOUNTER — APPOINTMENT (OUTPATIENT)
Dept: NEUROLOGY | Facility: CLINIC | Age: 68
End: 2018-09-25
Payer: MEDICARE

## 2018-09-25 VITALS
SYSTOLIC BLOOD PRESSURE: 107 MMHG | OXYGEN SATURATION: 98 % | RESPIRATION RATE: 18 BRPM | HEIGHT: 64 IN | HEART RATE: 64 BPM | DIASTOLIC BLOOD PRESSURE: 66 MMHG

## 2018-09-25 DIAGNOSIS — Z00.00 ENCOUNTER FOR GENERAL ADULT MEDICAL EXAMINATION W/OUT ABNORMAL FINDINGS: ICD-10-CM

## 2018-09-25 PROCEDURE — 99214 OFFICE O/P EST MOD 30 MIN: CPT

## 2018-10-05 ENCOUNTER — OTHER (OUTPATIENT)
Age: 68
End: 2018-10-05

## 2018-10-29 ENCOUNTER — CLINICAL ADVICE (OUTPATIENT)
Age: 68
End: 2018-10-29

## 2018-11-12 PROBLEM — C71.9 GLIOBLASTOMA: Status: ACTIVE | Noted: 2018-05-25

## 2018-11-12 PROBLEM — C71.9 MALIGNANT BRAIN TUMOR: Status: ACTIVE | Noted: 2018-11-12

## 2018-11-12 PROBLEM — Z98.890 STATUS POST STEREOTACTIC BRAIN BIOPSY: Status: ACTIVE | Noted: 2018-11-12

## 2019-08-05 PROBLEM — R56.9 SEIZURES: Status: ACTIVE | Noted: 2018-05-23

## 2022-01-01 NOTE — PATIENT PROFILE ADULT. - FALL HARM RISK CONCLUSION
Problem: PAIN -   Goal: Displays adequate comfort level or baseline comfort level  Description: INTERVENTIONS:  - Perform pain scoring using age-appropriate tool with hands-on care as needed  Notify physician/AP of high pain scores not responsive to comfort measures  - Administer analgesics based on type and severity of pain and evaluate response  - Sucrose analgesia per protocol for brief minor painful procedures  - Teach parents interventions for comforting infant  Outcome: Progressing     Problem: THERMOREGULATION - PEDIATRICS  Goal: Maintains normal body temperature  Description: Interventions:  - Monitor temperature (axillary for Newborns) as ordered  - Monitor for signs of hypothermia or hyperthermia  - Provide thermal support measures  - Wean to open crib when appropriate  Outcome: Progressing     Problem: INFECTION -   Goal: No evidence of infection  Description: INTERVENTIONS:  - Instruct family/visitors to use good hand hygiene technique  - Identify and instruct in appropriate isolation precautions for identified infection/condition  - Change incubator every 2 weeks or as needed  - Monitor for symptoms of infection  - Monitor surgical sites and insertion sites for all indwelling lines, tubes, and drains for drainage, redness, or edema   - Monitor endotracheal and nasal secretions for changes in amount and color  - Monitor culture and CBC results  - Administer antibiotics as ordered  Monitor drug levels  Outcome: Progressing     Problem: RISK FOR INFECTION (RISK FACTORS FOR MATERNAL CHORIOAMNIOITIS - )  Goal: No evidence of infection  Description: INTERVENTIONS:  - Instruct family/visitors to use good hand hygiene technique  - Monitor for symptoms of infection  - Monitor culture and CBC results  - Administer antibiotics as ordered    Monitor drug levels  Outcome: Progressing     Problem: SAFETY -   Goal: Patient will remain free from falls  Description: INTERVENTIONS:  - Instruct family/caregiver on patient safety  - Keep incubator doors and portholes closed when unattended  - Keep radiant warmer side rails and crib rails up when unattended  - Based on caregiver fall risk screen, instruct family/caregiver to ask for assistance with transferring infant if caregiver noted to have fall risk factors  Outcome: Progressing     Problem: Knowledge Deficit  Goal: Patient/family/caregiver demonstrates understanding of disease process, treatment plan, medications, and discharge instructions  Description: Complete learning assessment and assess knowledge base    Interventions:  - Provide teaching at level of understanding  - Provide teaching via preferred learning methods  Outcome: Progressing  Goal: Infant caregiver verbalizes understanding of benefits of skin-to-skin with healthy   Description: Prior to delivery, educate patient regarding skin-to-skin practice and its benefits  Initiate immediate and uninterrupted skin-to-skin contact after birth until breastfeeding is initiated or a minimum of one hour  Encourage continued skin-to-skin contact throughout the post partum stay    Outcome: Progressing  Goal: Infant caregiver verbalizes understanding of benefits and management of breastfeeding their healthy   Description: Help initiate breastfeeding within one hour of birth  Educate/assist with breastfeeding positioning and latch  Educate on safe positioning and to monitor their  for safety  Educate on how to maintain lactation even if they are  from their   Educate/initiate pumping for a mom with a baby in the NICU within 6 hours after birth  Give infants no food or drink other than breast milk unless medically indicated  Educate on feeding cues and encourage breastfeeding on demand    Outcome: Progressing  Goal: Infant caregiver verbalizes understanding of benefits to rooming-in with their healthy   Description: Promote rooming in 23 out of 24 hours per day  Educate on benefits to rooming-in  Provide  care in room with parents as long as infant and mother condition allow    Outcome: Progressing  Goal: Provide formula feeding instructions and preparation information to caregivers who do not wish to breastfeed their   Description: Provide one on one information on frequency, amount, and burping for formula feeding caregivers throughout their stay and at discharge  Provide written information/video on formula preparation  Outcome: Progressing  Goal: Infant caregiver verbalizes understanding of support and resources for follow up after discharge  Description: Provide individual discharge education on when to call the doctor  Provide resources and contact information for post-discharge support      Outcome: Progressing     Problem: DISCHARGE PLANNING  Goal: Discharge to home or other facility with appropriate resources  Description: INTERVENTIONS:  - Identify barriers to discharge w/patient and caregiver  - Arrange for needed discharge resources and transportation as appropriate  - Identify discharge learning needs (meds, wound care, etc )  - Arrange for interpretive services to assist at discharge as needed  - Refer to Case Management Department for coordinating discharge planning if the patient needs post-hospital services based on physician/advanced practitioner order or complex needs related to functional status, cognitive ability, or social support system  Outcome: Progressing     Problem: NORMAL   Goal: Experiences normal transition  Description: INTERVENTIONS:  - Monitor vital signs  - Maintain thermoregulation  - Assess for hypoglycemia risk factors or signs and symptoms  - Assess for sepsis risk factors or signs and symptoms  - Assess for jaundice risk and/or signs and symptoms  Outcome: Progressing  Goal: Total weight loss less than 10% of birth weight  Description: INTERVENTIONS:  - Assess feeding patterns  - Weigh daily  Outcome: Progressing     Problem: Adequate NUTRIENT INTAKE -   Goal: Nutrient/Hydration intake appropriate for improving, restoring or maintaining nutritional needs  Description: INTERVENTIONS:  - Assess growth and nutritional status of patients and recommend course of action  - Monitor nutrient intake, labs, and treatment plans  - Recommend appropriate diets and vitamin/mineral supplements  - Monitor and recommend adjustments to tube feedings and TPN/PPN based on assessed needs  - Provide specific nutrition education as appropriate  Outcome: Progressing  Goal: Breast feeding baby will demonstrate adequate intake  Description: Interventions:  - Monitor/record daily weights and I&O  - Monitor milk transfer  - Increase maternal fluid intake  - Increase breastfeeding frequency and duration  - Teach mother to massage breast before feeding/during infant pauses during feeding  - Pump breast after feeding  - Review breastfeeding discharge plan with mother   Refer to breast feeding support groups  - Initiate discussion/inform physician of weight loss and interventions taken  - Help mother initiate breast feeding within an hour of birth  - Encourage skin to skin time with  within 5 minutes of birth  - Give  no food or drink other than breast milk  - Encourage rooming in  - Encourage breast feeding on demand  - Initiate SLP consult as needed  Outcome: Progressing  Goal: Bottle fed baby will demonstrate adequate intake  Description: Interventions:  - Monitor/record daily weights and I&O  - Increase feeding frequency and volume  - Teach bottle feeding techniques to care provider/s  - Initiate discussion/inform physician of weight loss and interventions taken  - Initiate SLP consult as needed  Outcome: Progressing Fall with Harm Risk

## 2022-09-06 NOTE — PROVIDER CONTACT NOTE (OTHER) - DATE AND TIME:
Nutrition Care Plan     Nutrition Diagnosis:   Inadequate intake related to leaking/ infected GJ tube as evidenced by patient NPO with PN to meet needs - 9/3, tube feeding initiated .      Intervention:  Modified diet: NPO     Enteral formula/solution: Continue the same  Enteral Nutrition Formula: 1.5 Calorie Formula - No Fiber (Osmolite 1.5 Calorie)  Current Rate: 40 mL/hr  Access Site: Post Pyloric (: FL Feeding Tube Placment \"distal tip of the feeding tube was  confirmed overlying the distal duodenum\")  Calories Provided by Tube Feedin kcal  Protein Provided by Tube Feeding  Free Water Provided by Tube Feedin ml  Other Provided by Tube Feeding: one healthy shot daily (24 gm protein, 100 kcal)  Goal Rate: 40 mL/hr   · Total provided by tube feeding and healthy shot: 1540 kcal, 84 gm protein     Feeding tube flush: 30 mL every four hours and before/after medications for tube patency. Adjustments per provider.        Monitoring and Evaluation:  Total energy intake:   Modified goal is for patient to meet nutrition needs and tolerate enteral nutrition.  · Goal met     13-May-2018 22:22

## 2023-10-17 NOTE — PATIENT PROFILE ADULT. - AS SC BRADEN SENSORY
Detail Level: Simple
Price (Do Not Change): 0.00
Instructions: This plan will send the code FBSD to the PM system.  DO NOT or CHANGE the price.
(2) very limited

## 2023-10-24 NOTE — CONSULT NOTE ADULT - PROBLEM SELECTOR PROBLEM 1
no lesions, no deformities, no traumatic injuries, no significant scars are present, chest wall non-tender, no masses present, breathing is unlabored without accessory muscle use,normal breath sounds Brain tumor

## 2023-12-24 NOTE — PROGRESS NOTE ADULT - ASSESSMENT
NEURO:  Neurochecks q1h, CT head in AM, MRI post-op, Pain control, Keppra sz ppx, dexamethasone 4mg q6h  Activity: [] OOB as tolerated [] Bedrest [X] PT [X] OT [] PMNR    PULM:  Incentive spirometry, mobilize as tolerated    CV: Metoprolol 25mg bid (home med)  -150mmHg    RENAL:  IVF until good PO intake, d/c goncalves in AM    GI:  Diet: Dysphagia screen and then advance diet as tolerated  GI prophylaxis [X] not indicated [] PPI [] other:  Bowel regimen [] colace [] senna [] other:    ENDO:   Goal euglycemia (-180)    HEME/ONC:  VTE prophylaxis: [S] SCDs [] chemoprophylaxis [X] hold chemoprophylaxis due to: recent post op [] high risk of DVT/PE on admission due to: immobility, bed ridden    ID:  Maura-op antibiotics    MISC: PACU to floor    SOCIAL/FAMILY:  [] awaiting [] updated at bedside [] family meeting    CODE STATUS:  [X] Full Code [] DNR [] DNI [] Palliative/Comfort Care    DISPOSITION:  [X] ICU [] Stroke Unit [] Floor [] EMU [] RCU [] PCU    [] Patient is at high risk of neurologic deterioration/death due to:     Time seen:  Time spent: ___ [] critical care minutes    Contact: [phone] NEURO:  Neurochecks q1h, CT head in AM, MRI post-op, Pain control, Keppra sz ppx, dexamethasone 4mg q6h  Activity: [] OOB as tolerated [] Bedrest [X] PT [X] OT [] PMNR    PULM:  Incentive spirometry, mobilize as tolerated    CV: Metoprolol 25mg bid (home med)  -150mmHg    RENAL:  IVF until good PO intake, d/c goncalves in AM    GI:  Diet: Dysphagia screen and then advance diet as tolerated  GI prophylaxis [X] not indicated [] PPI [] other:  Bowel regimen [] colace [] senna [] other:    ENDO:   Goal euglycemia (-180)    HEME/ONC:  VTE prophylaxis: [S] SCDs [] chemoprophylaxis [X] hold chemoprophylaxis due to: recent post op [] high risk of DVT/PE on admission due to: immobility, bed ridden    ID:  Maura-op antibiotics    MISC: PACU to floor    SOCIAL/FAMILY:  [] awaiting [] updated at bedside [] family meeting    CODE STATUS:  [X] Full Code [] DNR [] DNI [] Palliative/Comfort Care    DISPOSITION:  [X] ICU [] Stroke Unit [] Floor [] EMU [] RCU [] PCU    [] Patient is at high risk of neurologic deterioration/death due to: ICH, seizures     Time seen:  Time spent: __45_ [] critical care minutes    Contact: [phone] 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    NEURO:  Neurochecks q1h, CT head in AM, MRI post-op, Pain control, Keppra sz ppx, dexamethasone 4mg q6h  Activity: [] OOB as tolerated [] Bedrest [X] PT [X] OT [] PMNR    PULM:  Incentive spirometry, mobilize as tolerated    CV: Metoprolol 25mg bid (home med)  -150mmHg    RENAL:  IVF until good PO intake, d/c goncalves in AM    GI:  Diet: Dysphagia screen and then advance diet as tolerated  GI prophylaxis [X] not indicated [] PPI [] other:  Bowel regimen [] colace [] senna [] other:    ENDO:   Goal euglycemia (-180)    HEME/ONC:  VTE prophylaxis: [S] SCDs [] chemoprophylaxis [X] hold chemoprophylaxis due to: recent post op [] high risk of DVT/PE on admission due to: immobility, bed ridden    ID:  Maura-op antibiotics    MISC: PACU to floor    SOCIAL/FAMILY:  [] awaiting [] updated at bedside [] family meeting    CODE STATUS:  [X] Full Code [] DNR [] DNI [] Palliative/Comfort Care    DISPOSITION:  [X] ICU [] Stroke Unit [] Floor [] EMU [] RCU [] PCU    [] Patient is at high risk of neurologic deterioration/death due to: ICH, seizures     Time seen:  Time spent: __45_ [] critical care minutes    Contact: [phone] 47F h/o cervical fusion.  2 days ago walking in house, developed L lateral thoracic back pain and L side CP.  Dimer neg.  Trop neg.  EKG nonischemic.  Paresthesias to L arm.  Getting MR T-spine and C-spine.  significant pain.  Skin unremarkable.  Pain seems to be more L flank, and pt does have hematuria but is on her period.  Possible kidney stone.  Dimer neg unlikely PE.  Pt went for MRI as well.  Normal distal neuro exam except mildly decreased dorsiflexion L foot which pt says is chronic after previous spinal surgery.  If all acceptable can d/c home f/u spine center as outpt.    VS:  unremarkable    GEN - NAD;   well appearing;   A+O x3   HEAD - NC/AT     ENT - PEERL, EOMI, mucous membranes    moist , no discharge      NECK: Neck supple, non-tender without lymphadenopathy, no masses, no JVD  PULM - CTA b/l,  symmetric breath sounds  COR -  normal heart sounds    ABD - , ND, NT, soft,  BACK - (+)L CVA tenderness, nontender spine     EXTREMS - no edema, no deformity, warm and well perfused    SKIN - no rash    or bruising      NEUROLOGIC - alert, face symmetric, speech fluent, sensation nl, motor no focal deficit except L dorsiflexion mild decrease (pt states chronic) 47F h/o cervical fusion.  2 days ago walking in house, developed L lateral thoracic back pain and L side CP.  Dimer neg.  Trop neg.  EKG nonischemic.  Paresthesias to L arm.  Getting MR T-spine and C-spine.  significant pain.  Skin unremarkable.  Pain seems to be more L flank, and pt does have hematuria but is on her period.  Possible kidney stone.  Dimer neg unlikely PE.  Pt went for MRI as well.  Normal distal neuro exam except mildly decreased dorsiflexion R foot which pt says is chronic after previous spinal surgery.  Mild L CVAT, will check CT a/p eval for stone.  If all acceptable can d/c home f/u spine center as outpt.    VS:  unremarkable    GEN - NAD;   well appearing;   A+O x3   HEAD - NC/AT     ENT - PEERL, EOMI, mucous membranes    moist , no discharge      NECK: Neck supple, non-tender without lymphadenopathy, no masses, no JVD  PULM - CTA b/l,  symmetric breath sounds  COR -  normal heart sounds    ABD - , ND, NT, soft,  BACK - (+)L CVA tenderness, nontender spine     EXTREMS - no edema, no deformity, warm and well perfused    SKIN - no rash    or bruising      NEUROLOGIC - alert, face symmetric, speech fluent, sensation nl, motor no focal deficit except R dorsiflexion mild decrease (pt states chronic)

## 2024-01-01 NOTE — H&P PST ADULT - PROBLEM SELECTOR PLAN 3
- may have white spots (pimple-like) on the nose and/ or chin. These are Milia and are due to clogged sweat glands. Do not squeeze. The Caprini score indicates this patient is at risk for a VTE event (score 3-5).  Most surgical patients in this group would benefit from pharmacologic prophylaxis.  The surgical team will determine the balance between VTE risk and bleeding risk